# Patient Record
Sex: FEMALE | Race: WHITE | NOT HISPANIC OR LATINO | Employment: FULL TIME | URBAN - NONMETROPOLITAN AREA
[De-identification: names, ages, dates, MRNs, and addresses within clinical notes are randomized per-mention and may not be internally consistent; named-entity substitution may affect disease eponyms.]

---

## 2018-05-03 PROBLEM — M54.2 CHRONIC NECK PAIN: Status: ACTIVE | Noted: 2018-05-03

## 2018-10-11 PROBLEM — E66.812 CLASS 2 SEVERE OBESITY DUE TO EXCESS CALORIES WITH SERIOUS COMORBIDITY AND BODY MASS INDEX (BMI) OF 37.0 TO 37.9 IN ADULT: Status: ACTIVE | Noted: 2017-11-15

## 2020-02-06 ENCOUNTER — TELEPHONE (OUTPATIENT)
Dept: FAMILY MEDICINE CLINIC | Facility: CLINIC | Age: 40
End: 2020-02-06

## 2020-02-06 DIAGNOSIS — M79.2 CHRONIC NEUROPATHIC PAIN: ICD-10-CM

## 2020-02-06 DIAGNOSIS — G89.29 CHRONIC NEUROPATHIC PAIN: ICD-10-CM

## 2020-02-06 RX ORDER — GABAPENTIN 600 MG/1
600 TABLET ORAL 4 TIMES DAILY PRN
Qty: 120 TABLET | Refills: 0 | Status: SHIPPED | OUTPATIENT
Start: 2020-02-06 | End: 2020-03-09 | Stop reason: SDUPTHER

## 2020-02-06 RX ORDER — TRIAMCINOLONE ACETONIDE 5 MG/G
CREAM TOPICAL 2 TIMES DAILY
Qty: 30 G | Refills: 2 | Status: SHIPPED | OUTPATIENT
Start: 2020-02-06 | End: 2020-06-01

## 2020-02-06 NOTE — TELEPHONE ENCOUNTER
JESSIE reviewed. Refill approved. Medication E rx'd to pharmacy as requested. Pt to keep f/u apt as scheduled.    Cream sent in

## 2020-02-06 NOTE — TELEPHONE ENCOUNTER
PT needs the cream that Dr. Ocampo prescribes when she breaks out due to the solution used at her job. Pt didn't know the name of the cream but she needs a refill sent in w/ the gabapentin.

## 2020-04-15 ENCOUNTER — TELEPHONE (OUTPATIENT)
Dept: FAMILY MEDICINE CLINIC | Facility: CLINIC | Age: 40
End: 2020-04-15

## 2020-04-15 RX ORDER — PREDNISONE 20 MG/1
20 TABLET ORAL DAILY
Qty: 14 TABLET | Refills: 0 | Status: SHIPPED | OUTPATIENT
Start: 2020-04-15 | End: 2020-04-22

## 2020-04-15 RX ORDER — SULFAMETHOXAZOLE AND TRIMETHOPRIM 800; 160 MG/1; MG/1
1 TABLET ORAL 2 TIMES DAILY
Qty: 10 TABLET | Refills: 0 | Status: SHIPPED | OUTPATIENT
Start: 2020-04-15 | End: 2020-06-01 | Stop reason: ALTCHOICE

## 2020-04-15 NOTE — TELEPHONE ENCOUNTER
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  PATIENT IS REQUESTING PREDISONE AND A CREAM TO TAKE CARE OF THIS RASH.    PHARMACY VERIFIED AS:  [facility] - [place] - [address] - [phone]  - [phone] FX     PLEASE CALL PATIENT IF ANY QUESTIONS OR CONCERNS [phone]

## 2020-04-15 NOTE — TELEPHONE ENCOUNTER
I have sent in rx's for prednisone and bactrim in case she feels it has gotten infected as it has previously.    She has triamcinolone as an active rx with refills so I wanted to clarify what cream she meant.

## 2020-10-13 PROBLEM — E11.9 TYPE 2 DIABETES MELLITUS TREATED WITHOUT INSULIN: Status: ACTIVE | Noted: 2017-12-14

## 2021-01-05 PROBLEM — M51.369 DDD (DEGENERATIVE DISC DISEASE), LUMBAR: Status: ACTIVE | Noted: 2021-01-05

## 2021-01-15 PROBLEM — E66.812 CLASS 2 SEVERE OBESITY DUE TO EXCESS CALORIES WITH SERIOUS COMORBIDITY AND BODY MASS INDEX (BMI) OF 37.0 TO 37.9 IN ADULT: Status: RESOLVED | Noted: 2017-11-15 | Resolved: 2021-01-15

## 2021-04-06 ENCOUNTER — TELEPHONE (OUTPATIENT)
Dept: FAMILY MEDICINE CLINIC | Facility: CLINIC | Age: 41
End: 2021-04-06

## 2021-04-06 NOTE — TELEPHONE ENCOUNTER
Caller: Antonio Clementine    Relationship: Self    Best call back number: [phone]    What medication are you requesting: DIFLUCON    What are your current symptoms: BURNING, ITCHING    How long have you been experiencing symptoms: COUPLE OF DAYS, TAKING ANTIBIOTICS AND FORGOT TO ASK FOR ONE. I GET OFF WORK AT 2 AND WOULD LOVE TO BE ABLE TO GET THIS AT THAT TIME IF POSSIBLE.    Have you had these symptoms before:    [x] Yes  [] No    Have you been treated for these symptoms before:   [x] Yes  [] No    If a prescription is needed, what is your preferred pharmacy and phone number: [facility] - [place], [place] - [address] - [phone]  - [phone] FX     Additional notes:

## 2021-05-07 ENCOUNTER — TELEPHONE (OUTPATIENT)
Dept: FAMILY MEDICINE CLINIC | Facility: CLINIC | Age: 41
End: 2021-05-07

## 2021-05-07 NOTE — TELEPHONE ENCOUNTER
PATIENT STATED THAT SHE WAS PRESCRIBED SITagliptin (Januvia) 100 MG tablet PATIENT WOULD LIKE TO KNOW DOES SHE STOP THE glipizide (GLUCOTROL) 5 MG tablet MEDICATION OR TAKE THEM BOTH.    CALL BACK:[phone]    PATIENT STATED PLEASE LEAVE MESSAGE IF NO ANSWER

## 2021-05-07 NOTE — TELEPHONE ENCOUNTER
Notified per previous patient advise request response from Dr Ocampo she is to take Metformin, Glipizide and Januvia

## 2022-03-01 ENCOUNTER — TELEPHONE (OUTPATIENT)
Dept: FAMILY MEDICINE CLINIC | Facility: CLINIC | Age: 42
End: 2022-03-01

## 2022-03-01 NOTE — TELEPHONE ENCOUNTER
Caller: Clementine Alvarez    Relationship: Self    Best call back number:  [phone]    What is the medical concern/diagnosis:  BACK AND NECK     What specialty or service is being requested:  PHYSICAL THERAPY     What is the provider, practice or medical service name: [address]     What is the office location: [place]     What is the office phone number:    Any additional details: PATIENT STATES SHE HAS NEW INSURANCE AND HAD NOT BEEN TO PHYSICAL THERAPY IN A FEW MONTHS   THEY TOLD HER SHE WOULD NEED TO REQUEST A NEW REFERRAL

## 2022-04-04 ENCOUNTER — TELEPHONE (OUTPATIENT)
Dept: FAMILY MEDICINE CLINIC | Facility: CLINIC | Age: 42
End: 2022-04-04

## 2022-04-04 NOTE — TELEPHONE ENCOUNTER
Caller: Clementine Alvarez    Relationship: Self    Best call back number: [phone]     What is the best time to reach you: ANY TIME     Who are you requesting to speak with (clinical staff, provider,  specific staff member): DR SULLIVAN OR HER NURSE     Do you know the name of the person who called: N/A    What was the call regarding: tiZANidine (ZANAFLEX) 4 MG tablet - PATIENT STATED THIS MEDICATION KEEPS DRYING HER OUT AND MAKING HER DEHYDRATED. SHE WANTS TO KNOW IF SHE CAN TRY THE OTHER MEDICATION MENTIONED AT HER LAST APPOINTMENT. PLEASE ADVISE      Do you require a callback: YES      no

## 2022-04-11 ENCOUNTER — TELEPHONE (OUTPATIENT)
Dept: FAMILY MEDICINE CLINIC | Facility: CLINIC | Age: 42
End: 2022-04-11

## 2022-04-11 RX ORDER — FLUCONAZOLE 150 MG/1
TABLET ORAL
Qty: 2 TABLET | Refills: 0 | Status: SHIPPED | OUTPATIENT
Start: 2022-04-11 | End: 2022-05-13

## 2022-04-11 NOTE — TELEPHONE ENCOUNTER
Caller: Clementine Alvarez    Relationship: Self    Best call back number: [phone]    What medication are you requesting: DIFLUCAN    What are your current symptoms: YEAST INFECTION FROM MEDICATION (BACTRIM)    How long have you been experiencing symptoms: SINCE TAKING BACTRIM FOR RASH    Have you had these symptoms before:    [x] Yes  [x] No    Have you been treated for these symptoms before:   [x] Yes  [x] No    If a prescription is needed, what is your preferred pharmacy and phone number: [facility] - [place] - [address] - [phone]  - [phone]      Additional notes:

## 2022-07-19 ENCOUNTER — TELEPHONE (OUTPATIENT)
Dept: FAMILY MEDICINE CLINIC | Facility: CLINIC | Age: 42
End: 2022-07-19

## 2022-07-20 NOTE — TELEPHONE ENCOUNTER
Caller: Clementine Alvarez    Relationship: Self    Best call back number: [phone]    What was the call regarding:   PATIENT IS REQUESTING TO SPEAK TO JOSE ELIAS REGARDING QUESTION'S SHE HAS ABOUT A DRUG SCREEN     Do you require a callback: YES

## 2022-07-20 NOTE — TELEPHONE ENCOUNTER
"Not sure what to \"advise\" other then her UDS here in our office was positive for tapentadol. Her UDS from the suboxone clinic were fine. Although not done on same day.    I am willing to continue prescribing but will not do so if urine drug screens are abnormal in the future.  "

## 2022-07-21 NOTE — TELEPHONE ENCOUNTER
Called patient informed her of message per  she stated she did not take any drug argued she was wrongly accused inflammatory bowel disease informed her that it was reported by lab and in future if anything shows up in urine Dr Ocampo will stop prescribing medication.

## 2022-07-28 ENCOUNTER — TELEPHONE (OUTPATIENT)
Dept: FAMILY MEDICINE CLINIC | Facility: CLINIC | Age: 42
End: 2022-07-28

## 2022-07-28 RX ORDER — FLUCONAZOLE 150 MG/1
TABLET ORAL
Qty: 2 TABLET | Refills: 0 | Status: SHIPPED | OUTPATIENT
Start: 2022-07-28 | End: 2022-08-24 | Stop reason: SDUPTHER

## 2022-07-28 NOTE — TELEPHONE ENCOUNTER
Caller: Clementine Alvarez    Relationship: Self    Best call back number: [phone]    What medication are you requesting: DIFLUCAN      Have you had these symptoms before:    [x] Yes  [] No      If a prescription is needed, what is your preferred pharmacy and phone number: [facility] - [place], [place] - [address] - [phone]  - [phone] FX

## 2022-10-14 ENCOUNTER — TELEPHONE (OUTPATIENT)
Dept: PREADMISSION TESTING | Facility: HOSPITAL | Age: 42
End: 2022-10-14

## 2022-11-07 ENCOUNTER — TELEPHONE (OUTPATIENT)
Dept: OBSTETRICS AND GYNECOLOGY | Facility: CLINIC | Age: 42
End: 2022-11-07

## 2022-11-07 NOTE — TELEPHONE ENCOUNTER
Caller: Clementine Alvarez    Relationship: Self    Best call back number:[phone]-CALL ANYTIME-PT DOESN'T HAVE ANSWERING MACHINE. PT STATED VM CAN BE LEFT ON MOBILE# IF NEEDED.     What form or medical record are you requesting: WORK RELEASE     Who is requesting this form or medical record from you:PT'S EMPLOYER    How would you like to receive the form or medical records (pick-up, mail, fax): Recommendo IF POSSIBLE. PT'S EMPLOYER DOES NOT HAVE A FAX NUMBER.    Timeframe paperwork needed: AS SOON AS POSSIBLE.     Additional notes: PT WENT BACK TO WORK TODAY 11.07.22. PT WAS SENT HOME BY EMPLOYER AND TOLD SHE WILL NEED AN MEDICAL RELEASE BEFORE SHE CAN RETURN.    PT HAD SURGERY 10.31.22-

## 2022-12-09 ENCOUNTER — TELEPHONE (OUTPATIENT)
Dept: FAMILY MEDICINE CLINIC | Facility: CLINIC | Age: 42
End: 2022-12-09

## 2022-12-09 NOTE — TELEPHONE ENCOUNTER
Caller: Clementine Alvarez    Relationship: Self    Best call back number: [phone]      Requested Prescriptions:    LASIX 40 MG    Pharmacy where request should be sent: [facility] - [place], [place] - [address] - [phone]  - [phone] FX     Additional details provided by patient: PT STATED RX WAS PRESCRIBED BY PCP, STATED THAT SHE HARDLY TAKES IT.    Does the patient have less than a 3 day supply:  [x] Yes  [] No    Would you like a call back once the refill request has been completed: [x] Yes [] No    If the office needs to give you a call back, can they leave a voicemail: [x] Yes [] No    Jose Alejandro Rashid Rep   12/09/22 10:19 EST

## 2022-12-12 RX ORDER — FUROSEMIDE 20 MG/1
TABLET ORAL
Qty: 30 TABLET | Refills: 0 | Status: SHIPPED | OUTPATIENT
Start: 2022-12-12 | End: 2023-05-02

## 2022-12-28 ENCOUNTER — TELEPHONE (OUTPATIENT)
Dept: FAMILY MEDICINE CLINIC | Facility: CLINIC | Age: 42
End: 2022-12-28

## 2022-12-28 NOTE — TELEPHONE ENCOUNTER
Caller: Clementine Alvarez    Relationship: Self    Best call back number:[phone]     What was the call regarding: PATIENT CALLED TO REQUEST A PRIOR AUTHORIZATION FOR pregabalin (Lyrica) 200 MG capsule    Do you require a callback: YES

## 2022-12-30 ENCOUNTER — TELEPHONE (OUTPATIENT)
Dept: FAMILY MEDICINE CLINIC | Facility: CLINIC | Age: 42
End: 2022-12-30

## 2022-12-30 DIAGNOSIS — M79.2 CHRONIC NEUROPATHIC PAIN: ICD-10-CM

## 2022-12-30 DIAGNOSIS — G89.29 CHRONIC NEUROPATHIC PAIN: ICD-10-CM

## 2022-12-30 NOTE — TELEPHONE ENCOUNTER
Caller: Clementine Alvarez    Relationship: Self    Best call back number: [phone]    What is the best time to reach you: ANYTIME     Who are you requesting to speak with (clinical staff, provider,  specific staff member): CLINICAL    Do you know the name of the person who called: JOSE ELIAS    What was the call regarding: PATIENT IS HAVING ISSUES GETTING HER MEDICATION LYRICA, PLEASE REACH OUT AND ADVISE.    Do you require a callback: YES

## 2023-01-03 NOTE — TELEPHONE ENCOUNTER
Lyrica was denied by insurance ([place] was denied as well) would you like to send in RX for Gabapentin?

## 2023-01-04 RX ORDER — GABAPENTIN 800 MG/1
800 TABLET ORAL 3 TIMES DAILY
Qty: 90 TABLET | Refills: 0 | Status: SHIPPED | OUTPATIENT
Start: 2023-01-04 | End: 2023-01-30 | Stop reason: SDUPTHER

## 2023-02-16 ENCOUNTER — TELEPHONE (OUTPATIENT)
Dept: FAMILY MEDICINE CLINIC | Facility: CLINIC | Age: 43
End: 2023-02-16

## 2023-02-16 ENCOUNTER — TELEPHONE (OUTPATIENT)
Dept: PREADMISSION TESTING | Facility: HOSPITAL | Age: 43
End: 2023-02-16

## 2023-02-16 NOTE — TELEPHONE ENCOUNTER
Caller: Clementine Alvarez    Relationship: Self    Best call back number:      [phone]     What medication are you requesting:     ANTIBIOTIC FOR UTI SYMPTOMS    BACTRIM  DIFLUCAN    What are your current symptoms:     URGENCY TO URINATE  FREQUENCY    How long have you been experiencing symptoms:     APPROXIMATELY (2) DAYS    Have you had these symptoms before:    [x] Yes  [] No    Have you been treated for these symptoms before:   [x] Yes  [] No    If a prescription is needed, what is your preferred pharmacy and phone number:      [facility] - [place], [place]    TELEPHONE CONTACT:    [phone]    DR SULLIVAN

## 2023-02-16 NOTE — TELEPHONE ENCOUNTER
Patient came into the office for urine.  Results in Epic.  Culture has been ordered.  Please advise (clinical pool as I am out until Monday) if medication is sent to the pharmacy.  Thanks

## 2023-04-10 ENCOUNTER — TELEPHONE (OUTPATIENT)
Dept: NEUROSURGERY | Facility: CLINIC | Age: 43
End: 2023-04-10

## 2023-04-10 NOTE — TELEPHONE ENCOUNTER
JAKE FROM [facility] CALLED AND STATES A REFERRAL WAS SENT TO THEIR OFFICE AND IT WAS NOT ADDRESSED TO ANY DOCTOR.  STATES THIS NEEDS TO BE CORRECTED FOR FUTURE REFERNCE  - ALL REFERRALS MUST INCLUDE A 'S NAME - THANK YOU!

## 2023-04-10 NOTE — TELEPHONE ENCOUNTER
Caller: Clementine Alvarez    Relationship: Self    Best call back number: [phone]    Who are you requesting to speak with (clinical staff, provider,  specific staff member): CLINICAL STAFF    What was the call regarding: PATIENT IS CALLING TO VERIFY REFERRAL TO [facility] HAS BEEN COMPLETED.    Do you require a callback: YES; PLEASE CALL PATIENT TO ADVISE WHETHER REFERRAL HAS BEEN SENT.

## 2023-05-31 ENCOUNTER — TELEPHONE (OUTPATIENT)
Dept: FAMILY MEDICINE CLINIC | Facility: CLINIC | Age: 43
End: 2023-05-31

## 2023-05-31 NOTE — TELEPHONE ENCOUNTER
Caller: Clementine Alavrez    Relationship to patient: Self    Best call back number: [phone]    Chief complaint: NEEDS RX REFILLED AND CANNOT WITHOUT APPT    Type of visit: OFFICE OR MYCHART?    Requested date: MONDAY-Thursday AFTER 5PM OR ANYTIME ON Friday DUE TO WORK SCHEDULE    Additional notes: PATIENT STATED THAT SHE HAS 10 DAYS LEFT OF HER DIABETIC MEDICATION. AND THE PHARMACY ADVISED HER SHE NEEDED AN APPT WITH US. BUT NOTHING IS  AVAILABLE THAT WORKS WITH HER WORK SCHEDULE.  PLEASE ADVISE IF YOU CAN WORK HER IN ON A Friday. SHE SAID YOU CAN LEAVE A DETAILED MESSAGE.  THANK YOU.

## 2023-06-05 NOTE — TELEPHONE ENCOUNTER
Caller: Clementine Alvarez    Relationship to patient: Self    Best call back number: [phone]    Patient is needing: PATIENT CALLED BACK AND HUB MADE APPOINTMENT ON 06/09/23

## 2023-06-08 ENCOUNTER — TELEPHONE (OUTPATIENT)
Dept: PREADMISSION TESTING | Facility: HOSPITAL | Age: 43
End: 2023-06-08

## 2023-06-13 ENCOUNTER — TELEPHONE (OUTPATIENT)
Dept: FAMILY MEDICINE CLINIC | Facility: CLINIC | Age: 43
End: 2023-06-13

## 2023-06-13 NOTE — TELEPHONE ENCOUNTER
Caller: Clementine Alvarez    Relationship: Self    Best call back number: [phone]    What medication are you requesting: DIFLUCAN     What are your current symptoms: VAGINAL ITCHING    How long have you been experiencing symptoms: 06.10.23    Have you had these symptoms before:    [x] Yes  [] No    Have you been treated for these symptoms before:   [x] Yes  [] No    If a prescription is needed, what is your preferred pharmacy and phone number: [facility] - [place] - [address] - [phone]  - [phone]      Additional notes: PATIENT ADVISES SHE HAS BEEN ON ANTIBIOTICS FOR A TOOTH INFECTION AND THE ANTIBIOTICS USUALLY GIVE HER A YEAST INFECTION.

## 2023-06-16 PROBLEM — Z98.51 S/P TUBAL LIGATION: Status: ACTIVE | Noted: 2023-06-16

## 2023-06-16 PROBLEM — Z30.2 ENCOUNTER FOR STERILIZATION: Status: RESOLVED | Noted: 2023-05-09 | Resolved: 2023-06-16

## 2023-09-22 RX ORDER — FLURBIPROFEN SODIUM 0.3 MG/ML
SOLUTION/ DROPS OPHTHALMIC
Qty: 100 EACH | Refills: 4 | Status: SHIPPED | OUTPATIENT
Start: 2023-09-22

## 2023-09-27 DIAGNOSIS — E61.1 IRON DEFICIENCY: ICD-10-CM

## 2023-09-27 RX ORDER — DOXYCYCLINE HYCLATE 50 MG/1
324 CAPSULE, GELATIN COATED ORAL 2 TIMES DAILY WITH MEALS
Qty: 60 TABLET | Refills: 0 | Status: SHIPPED | OUTPATIENT
Start: 2023-09-27

## 2023-09-29 ENCOUNTER — TELEMEDICINE (OUTPATIENT)
Dept: PSYCHIATRY | Facility: CLINIC | Age: 43
End: 2023-09-29
Payer: COMMERCIAL

## 2023-09-29 DIAGNOSIS — F43.10 POST TRAUMATIC STRESS DISORDER (PTSD): ICD-10-CM

## 2023-09-29 DIAGNOSIS — F33.0 MILD EPISODE OF RECURRENT MAJOR DEPRESSIVE DISORDER: ICD-10-CM

## 2023-09-29 DIAGNOSIS — F51.5 NIGHTMARES: ICD-10-CM

## 2023-09-29 DIAGNOSIS — F41.1 GENERALIZED ANXIETY DISORDER: ICD-10-CM

## 2023-09-29 RX ORDER — PRAZOSIN HYDROCHLORIDE 1 MG/1
1 CAPSULE ORAL NIGHTLY
Qty: 30 CAPSULE | Refills: 2 | Status: SHIPPED | OUTPATIENT
Start: 2023-09-29

## 2023-09-29 RX ORDER — BUSPIRONE HYDROCHLORIDE 15 MG/1
15 TABLET ORAL 4 TIMES DAILY
Qty: 120 TABLET | Refills: 2 | Status: SHIPPED | OUTPATIENT
Start: 2023-09-29

## 2023-09-29 RX ORDER — FLUOXETINE 10 MG/1
10 CAPSULE ORAL DAILY
Qty: 30 CAPSULE | Refills: 2 | Status: SHIPPED | OUTPATIENT
Start: 2023-09-29

## 2023-09-29 NOTE — PROGRESS NOTES
This provider is located at [facility], Behavioral Health ,[address], [address] [facility] in [place] [address], using a secure MyChart Video Visit through [employer]. Patient is being seen remotely via telehealth at their home address in [place] [address], and stated they are in a secure environment for this session. The patient's condition being diagnosed/treated is appropriate for telemedicine. The provider identified herself as well as her credentials. The patient, and/or patients guardian, consent to be seen remotely, and when consent is given they understand that the consent allows for patient identifiable information to be sent to a third party as needed.   They may refuse to be seen remotely at any time. The electronic data is encrypted and password protected, and the patient and/or guardian has been advised of the potential risks to privacy not withstanding such measures.     You have chosen to receive care through a telehealth visit.  Do you consent to use a video/audio connection for your medical care today? Yes    Subjective   Clementine Johnson is a 43 y.o. female who presents today for follow up    Chief Complaint: Depression and anxiety    History of Present Illness:   History of Present Illness  Clementine Johnson presents today via MyChart video visit for medication management follow-up.  Taking Prozac 10 mg daily, prazosin 1 mg nightly and buspirone 15 mg 4 times daily.  Reports increased stressors that have caused increased anxiety levels. Recently changed jobs and is now working second shift. Says that she goes into work around 4 PM and is off around 2:30 AM. Continues to struggle with neck pain.  Had appointment for evaluation with neurology for her neck pain, but appointment was rescheduled to later date (December 1). Says that these things make her feel anxious, but feels symptoms are manageable at this time. Rates anxiety 6-7/10 on a 0-10 scale with 10 being the worst.   "Feels that depression is adequately controlled, says that there have been some positive things occur. Her divorce was finalized and now has her home in her name.  Sleep is sometimes decreased, often due to pain and current shift with work.  Voices that she continues to notice improvement in nightmares.  Denies any appetite changes. Has continued with MAT and says she is doing well. Denies any SI/HI or AVH.     Previous Psych Meds: Zoloft, Paxil, Xanax.  Currently taking Prozac, buspirone and hydroxyzine x 5 years.     The following portions of the patient's history were reviewed and updated as appropriate: allergies, current medications, past family history, past medical history, past social history, past surgical history and problem list.      Past Medical History:  Past Medical History:   Diagnosis Date    Anxiety     Arthritis     DDD (degenerative disc disease), cervical     DDD (degenerative disc disease), lumbar     Depression     Diabetes mellitus     Ear piercing     Gestational diabetes     HPV (human papilloma virus) infection     Lower back pain     PMS (premenstrual syndrome)     Pregnancy     A0 s/p CS x 1    Seasonal allergies     Substance abuse     Toxemia in pregnancy     Trauma     Varicella     Wears contact lenses     Wears glasses        Social History:  Social History     Socioeconomic History    Marital status: Legally    Tobacco Use    Smoking status: Every Day     Packs/day: 1.00     Years: 22.00     Pack years: 22.00     Types: Cigarettes     Start date: 1995     Passive exposure: Current    Smokeless tobacco: Never   Vaping Use    Vaping Use: Never used   Substance and Sexual Activity    Alcohol use: No    Drug use: Not Currently     Types: Benzodiazepines, Hydrocodone, Marijuana, Oxycodone     Comment: last use for any pills has been \"years\".  Last marijuana use was 5-6 months ago (assessed 10/17/22)    Sexual activity: Defer       Family History:  Family History "   Problem Relation Age of Onset    Arthritis Mother     Arthritis Father     Arthritis Maternal Aunt     Stroke Paternal Uncle     Arthritis Paternal Grandmother     Stroke Maternal Grandmother        Past Surgical History:  Past Surgical History:   Procedure Laterality Date     SECTION      x1    COLPOSCOPY N/A 10/31/2022    Procedure: COLPOSCOPY OF THE VULVA, WIDE LOCAL EXCISION OF VULVAR LESIONS TIMES FIVE;  Surgeon: Nelson Copeland MD;  Location: [facility];  Service: Obstetrics/Gynecology;  Laterality: N/A;    TUBAL COAGULATION LAPAROSCOPIC N/A 2023    Procedure: LAPAROSCOPIC TUBAL LIGATION, DRAINAGE OF LEFT OVARIAN CYST, HYSTEROSCOPY, DILATION AND CURETTAGE, ENDOMETRIAL ABLATION WITH NOVASURE;  Surgeon: Nelson Copeland MD;  Location: [facility] OR;  Service: Obstetrics/Gynecology;  Laterality: N/A;    VULVA BIOPSY N/A 3/3/2023    Procedure: Colposcopy of vulva, Wide local excision of vulvar lesion;  Surgeon: Nelson Copeland MD;  Location: [facility];  Service: Obstetrics/Gynecology;  Laterality: N/A;    WISDOM TOOTH EXTRACTION         Problem List:  Patient Active Problem List   Diagnosis    Depression with anxiety    Meralgia paresthetica of left side    Vitamin D deficiency    Type 2 diabetes mellitus with hyperglycemia, without long-term current use of insulin    Chronic neck pain    Cervical radiculopathy    Seasonal allergic rhinitis due to pollen    Bronchospasm    Essential hypertension    Situational anxiety    DDD (degenerative disc disease), cervical    DDD (degenerative disc disease), lumbar    Bilateral carpal tunnel syndrome    Chronic contact dermatitis    Abnormal uterine bleeding (AUB)    Genital warts    Vulvar intraepithelial neoplasia (FAN) grade 3    Morbid obesity    Posttraumatic torticollis    Dependent edema    S/P tubal ligation    S/P endometrial ablation       Allergy:   Allergies   Allergen Reactions    Nsaids Rash and Swelling     Swelling in the legs/feet     Codeine Rash    Erythromycin Rash    Erythromycin Base Rash    Tramadol Rash        Current Medications:   Current Outpatient Medications   Medication Sig Dispense Refill    busPIRone (BUSPAR) 15 MG tablet Take 1 tablet by mouth 4 (Four) Times a Day. 120 tablet 2    FLUoxetine (PROzac) 10 MG capsule Take 1 capsule by mouth Daily. 30 capsule 2    prazosin (MINIPRESS) 1 MG capsule Take 1 capsule by mouth Every Night. 30 capsule 2    B-D UF III MINI PEN NEEDLES 31G X 5 MM misc FOR USE WITH INSULIN PENS. FOLLOW INSTRUCTIONS ON INSULIN PENS. 100 each 4    baclofen (LIORESAL) 10 MG tablet 1-2 po tid as needed for muscle spasm 90 tablet 2    buprenorphine-naloxone (SUBOXONE) 8-2 MG per SL tablet PLACE 2 TABLET UNDER TONGUE ONCE A DAY      enalapril (VASOTEC) 5 MG tablet Take 1 tablet by mouth Daily. 30 tablet 11    ergocalciferol (ERGOCALCIFEROL) 1.25 MG (38319 UT) capsule Take 1 capsule by mouth Every 7 (Seven) Days. 4 capsule 2    ferrous gluconate (FERGON) 324 MG tablet Take 1 tablet by mouth 2 (Two) Times a Day With Meals. Or take with vitamin C. 60 tablet 0    gabapentin (NEURONTIN) 800 MG tablet Take 1 tablet by mouth 3 (Three) Times a Day. 90 tablet 1    glipizide (GLUCOTROL) 5 MG tablet Take 1 tablet by mouth 2 (Two) Times a Day Before Meals. 60 tablet 11    glucose blood (ONE TOUCH ULTRA TEST) test strip Check blood sugar twice a day. 100 each 2    glucose monitor monitoring kit 1 each 2 (Two) Times a Day 1 each 0    HumuLIN 70/30 KwikPen (70-30) 100 UNIT/ML suspension pen-injector       hydrOXYzine (ATARAX) 25 MG tablet Take 1 tablet by mouth Every 6 (Six) Hours As Needed.  0    Insulin Pen Needle 30G X 5 MM misc For use with insulin pens. Follow instructions on insulin pens. 100 each 5    metFORMIN ER (GLUCOPHAGE-XR) 500 MG 24 hr tablet Take 2 tablets by mouth Daily With Breakfast. 60 tablet 11    ONETOUCH DELICA LANCETS 33G misc Inject 1 each under the skin into the appropriate area as directed 2 (Two) Times a  Day. 100 each 11    pregabalin (Lyrica) 200 MG capsule Take 1 capsule by mouth 3 (Three) Times a Day. 90 capsule 0    SITagliptin (Januvia) 100 MG tablet Take 1 tablet by mouth Daily. 30 tablet 11     No current facility-administered medications for this visit.     Review of Systems   Constitutional:  Negative for activity change, appetite change, unexpected weight gain and unexpected weight loss.   Respiratory:  Negative for shortness of breath.    Cardiovascular:  Negative for chest pain.   Psychiatric/Behavioral:  Positive for sleep disturbance and depressed mood. Negative for suicidal ideas. The patient is nervous/anxious.      Physical Exam  Constitutional:       General: She is not in acute distress.     Appearance: Normal appearance.   Neurological:      Mental Status: She is alert.     Vitals:   There were no vitals taken for this visit. There is no height or weight on file to calculate BMI.  Due to extenuating circumstances and possible current health risks associated with the patient being present in a clinical setting (with current health restrictions in place in regards to possible COVID 19 transmission/exposure), the patient was seen remotely today via a MyChart Video Visit through [employer].  Unable to obtain vital signs due to nature of remote visit.    Mental Status Exam:   Hygiene:    Appears good  Cooperation:  Cooperative  Eye Contact:   ANA  Psychomotor Behavior:  Appropriate  Affect:  Appropriate  Mood: normal  Hopelessness: Denies  Speech:  Normal  Thought Process:  Goal directed and Linear  Thought Content:  Mood congruent  Suicidal:  None  Homicidal:  None  Hallucinations:  None  Delusion:  None  Memory:  Intact  Orientation:  Person, Place, Time, and Situation  Reliability:  good  Insight:  Good  Judgement:  Good  Impulse Control:  Good    Lab Results:   Admission on 06/16/2023, Discharged on 06/16/2023   Component Date Value Ref Range Status    HCG, Urine, QL 06/16/2023 Negative  Negative Final     Lot Number 2023 [id number]   Final    Internal Positive Control 2023 Passed  Positive, Passed Final    Internal Negative Control 2023 Passed  Negative, Passed Final    Expiration Date 2023   Final    Glucose 2023 209 (H)  70 - 130 mg/dL Final    Serial Number: [id number]:  [id number]    Reference Lab Report 2023    Final                    Value:[facility]  [address] [place] [address]  Phone: [phone] or [phone]  Fax: [phone]  Paolo Anderson M.D., Medical Director    PATIENT NAME                                     LABORATORY NO.  TAMAR HUITRON.                                [id number]  [id number]                                 AGE                    SEX   N              CLIENT REF #  [facility]                    43        1980      F     [id number]      [id number]    [address] [place] [address] BY-PASS                        REQUESTING M.D.           ATTENDING M.D.         COPY TO.   [address]                                [place], [place] [address]                         DATE COLLECTED            DATE RECEIVED          DATE REPORTED  2023    DIAGNOSIS:  A.     OVARIAN CYST, LEFT:  Hemorrhagic corpus luteal cyst  Negative for malignancy  B.     ENDOMETRIUM,                           CURETTINGS:  Disordered proliferative phase endometrium  Negative for hyperplasia or malignancy    RLL    CLINICAL HISTORY:  Abnormal uterine bleeding (AUB), encounter for sterilization    SPECIMENS RECEIVED:  A.    OVARIAN CYST, LEFT  B.    ENDOMETRIUM, CURETTINGS    MICROSCOPIC DESCRIPTION:  Tissue blocks are prepared and slides are examined microscopically on all  specimens. See diagnosis for details.    Professional interpretation rendered by Paolo Anderson M.D., F.C.A.P. at  [facility], "[employer] [address] [place] [address]  "[place] [address].    GROSS DESCRIPTION:  A.    Received in formalin labeled \"left ovarian cyst is a 1.8 x 1.5 x 1.2 cm  portion of dark red, hemorrhagic tissue, bisected and submitted in block  A1.  B.    Received in formalin labeled \"endometrial curettings\" is a 2.5 x 2.5 x 0.4  cm aggregate of red-tan morcellated soft tissue fragments, blood and  mucus that are filtered and submitted entirely in block B1.  HDM    REVIEWED, DIAGNOSED AND ELECTRONICALLY  SIGNED                           BY:    Paolo Anderson M.D., FARZANA  CPT CODES:  88305x2     Pre-Admission Testing on 06/09/2023   Component Date Value Ref Range Status    Color, UA 06/09/2023 Yellow  Yellow, Straw Final    Appearance, UA 06/09/2023 Clear  Clear Final    pH, UA 06/09/2023 6.5  5.0 - 8.0 Final    Specific Gravity, UA 06/09/2023 1.016  1.005 - 1.030 Final    Glucose, UA 06/09/2023 Negative  Negative Final    Ketones, UA 06/09/2023 Negative  Negative Final    Bilirubin, UA 06/09/2023 Negative  Negative Final    Blood, UA 06/09/2023 Negative  Negative Final    Protein, UA 06/09/2023 Trace (A)  Negative Final    Leuk Esterase, UA 06/09/2023 Negative  Negative Final    Nitrite, UA 06/09/2023 Negative  Negative Final    Urobilinogen, UA 06/09/2023 0.2 E.U./dL  0.2 - 1.0 E.U./dL Final    Glucose 06/09/2023 151 (H)  65 - 99 mg/dL Final    BUN 06/09/2023 5 (L)  6 - 20 mg/dL Final    Creatinine 06/09/2023 0.67  0.57 - 1.00 mg/dL Final    Sodium 06/09/2023 141  136 - 145 mmol/L Final    Potassium 06/09/2023 4.0  3.5 - 5.2 mmol/L Final    Chloride 06/09/2023 103  98 - 107 mmol/L Final    CO2 06/09/2023 28.3  22.0 - 29.0 mmol/L Final    Calcium 06/09/2023 9.4  8.6 - 10.5 mg/dL Final    BUN/Creatinine Ratio 06/09/2023 7.5  7.0 - 25.0 Final    Anion Gap 06/09/2023 9.7  5.0 - 15.0 mmol/L Final    eGFR 06/09/2023 112.1  >60.0 mL/min/1.73 Final    WBC 06/09/2023 10.75  3.40 - 10.80 10*3/mm3 Final    RBC 06/09/2023 4.97  3.77 - 5.28 10*6/mm3 Final    " Hemoglobin 06/09/2023 13.9  12.0 - 15.9 g/dL Final    Hematocrit 06/09/2023 42.9  34.0 - 46.6 % Final    MCV 06/09/2023 86.3  79.0 - 97.0 fL Final    MCH 06/09/2023 28.0  26.6 - 33.0 pg Final    MCHC 06/09/2023 32.4  31.5 - 35.7 g/dL Final    RDW 06/09/2023 13.3  12.3 - 15.4 % Final    RDW-SD 06/09/2023 41.4  37.0 - 54.0 fl Final    MPV 06/09/2023 10.2  6.0 - 12.0 fL Final    Platelets 06/09/2023 286  140 - 450 10*3/mm3 Final    Neutrophil % 06/09/2023 71.5  42.7 - 76.0 % Final    Lymphocyte % 06/09/2023 18.8 (L)  19.6 - 45.3 % Final    Monocyte % 06/09/2023 7.1  5.0 - 12.0 % Final    Eosinophil % 06/09/2023 1.5  0.3 - 6.2 % Final    Basophil % 06/09/2023 0.7  0.0 - 1.5 % Final    Immature Grans % 06/09/2023 0.4  0.0 - 0.5 % Final    Neutrophils, Absolute 06/09/2023 7.69 (H)  1.70 - 7.00 10*3/mm3 Final    Lymphocytes, Absolute 06/09/2023 2.02  0.70 - 3.10 10*3/mm3 Final    Monocytes, Absolute 06/09/2023 0.76  0.10 - 0.90 10*3/mm3 Final    Eosinophils, Absolute 06/09/2023 0.16  0.00 - 0.40 10*3/mm3 Final    Basophils, Absolute 06/09/2023 0.08  0.00 - 0.20 10*3/mm3 Final    Immature Grans, Absolute 06/09/2023 0.04  0.00 - 0.05 10*3/mm3 Final    nRBC 06/09/2023 0.0  0.0 - 0.2 /100 WBC Final   Office Visit on 05/02/2023   Component Date Value Ref Range Status    Hemoglobin A1C 05/02/2023 7.9  % Final    Lot Number 05/02/2023 [id number]   Final    Expiration Date 05/02/2023 11/3/2024   Final       EKG Results:  No orders to display       Assessment & Plan   Problems Addressed this Visit    None  Visit Diagnoses       Generalized anxiety disorder        Relevant Medications    FLUoxetine (PROzac) 10 MG capsule    busPIRone (BUSPAR) 15 MG tablet    Post traumatic stress disorder (PTSD)        Relevant Medications    FLUoxetine (PROzac) 10 MG capsule    busPIRone (BUSPAR) 15 MG tablet    Nightmares        Relevant Medications    prazosin (MINIPRESS) 1 MG capsule    FLUoxetine (PROzac) 10 MG capsule    busPIRone (BUSPAR) 15  MG tablet    Mild episode of recurrent major depressive disorder        Relevant Medications    FLUoxetine (PROzac) 10 MG capsule    busPIRone (BUSPAR) 15 MG tablet          Diagnoses         Codes Comments    Generalized anxiety disorder     ICD-10-CM: F41.1  ICD-9-CM: 300.02     Post traumatic stress disorder (PTSD)     ICD-10-CM: F43.10  ICD-9-CM: 309.81     Nightmares     ICD-10-CM: F51.5  ICD-9-CM: 307.47     Mild episode of recurrent major depressive disorder     ICD-10-CM: F33.0  ICD-9-CM: 296.31             Visit Diagnoses:    ICD-10-CM ICD-9-CM   1. Generalized anxiety disorder  F41.1 300.02   2. Post traumatic stress disorder (PTSD)  F43.10 309.81   3. Nightmares  F51.5 307.47   4. Mild episode of recurrent major depressive disorder  F33.0 296.31       Clementine presents today via MyChart video visit for medication management follow-up.  She has noticed increase in anxiety lately, attributes this to stressors related to situational issues.  Recently changed jobs and is now working second shift and is having trouble with this schedule. Also having neck pain, had evaluation scheduled with neurology, but appointment was postponed to December 1st. Voices that depression is currently adequately controlled.  Says that she is happy with current medication regimen and would like to continue with all medications as previously prescribed.  Will continue with Prozac 10 mg daily, prazosin 1 mg nightly and buspirone 15 mg 4 times daily.  Denies any adverse effects of current medication regimen.    -Refill Prozac 10 mg daily for anxiety and depression  -Refill prazosin 1 mg at night for nightmares/sleep  -Refill buspirone 15 mg 4 times daily for anxiety    -Reviewed previous available documentation and most recent available labs. JESSIE reviewed and is appropriate.      GOALS:  Short Term Goals: Patient will be compliant with medication, and patient will have no significant medication related side effects.  Patient will be  engaged in psychotherapy as indicated.  Patient will report subjective improvement of symptoms.  Long term goals: To stabilize mood and treat/improve subjective symptoms, the patient will stay out of the hospital, the patient will be at an optimal level of functioning, and the patient will take all medications as prescribed.  The patient/guardian verbalized understanding and agreement with goals that were mutually set.    TREATMENT PLAN: Continue supportive psychotherapy efforts and medications as indicated for patient's diagnosis.  Pharmacological and Non-Pharmacological treatment options discussed during today's visit. Patient/Guardian acknowledged and verbally consented with current treatment plan and was educated on the importance of compliance with treatment and follow-up appointments.      MEDICATION ISSUES:  Discussed medication options and treatment plan of prescribed medication as well as the risks, benefits, any black box warnings, and side effects including potential falls, possible impaired driving, and metabolic adversities among others. Patient is agreeable to call the office with any worsening of symptoms or onset of side effects, or if any concerns or questions arise.  The contact information for the office is made available to the patient. Patient is agreeable to call 911 or go to the nearest ER should they begin having any SI/HI, or if any urgent concerns arise. No medication side effects or related complaints today.     MEDS ORDERED DURING VISIT:  New Medications Ordered This Visit   Medications    prazosin (MINIPRESS) 1 MG capsule     Sig: Take 1 capsule by mouth Every Night.     Dispense:  30 capsule     Refill:  2    FLUoxetine (PROzac) 10 MG capsule     Sig: Take 1 capsule by mouth Daily.     Dispense:  30 capsule     Refill:  2    busPIRone (BUSPAR) 15 MG tablet     Sig: Take 1 tablet by mouth 4 (Four) Times a Day.     Dispense:  120 tablet     Refill:  2     06/02/2023 11:36:28 AM        FOLLOW UP:  Return in about 3 months (around 12/29/2023) for Recheck.             This document has been electronically signed by SHANELLE Galvez  September 29, 2023 09:43 EDT    Please note that portions of this note were completed with a voice recognition program. Efforts were made to edit dictation, but occasionally words are mistranscribed.

## 2023-10-03 DIAGNOSIS — G56.03 BILATERAL CARPAL TUNNEL SYNDROME: ICD-10-CM

## 2023-10-03 DIAGNOSIS — M51.36 DDD (DEGENERATIVE DISC DISEASE), LUMBAR: ICD-10-CM

## 2023-10-03 DIAGNOSIS — M50.30 DDD (DEGENERATIVE DISC DISEASE), CERVICAL: ICD-10-CM

## 2023-10-03 DIAGNOSIS — M54.12 CERVICAL RADICULOPATHY: ICD-10-CM

## 2023-10-03 RX ORDER — PREGABALIN 200 MG/1
200 CAPSULE ORAL 3 TIMES DAILY
Qty: 90 CAPSULE | Refills: 1 | Status: SHIPPED | OUTPATIENT
Start: 2023-10-03

## 2023-10-03 NOTE — TELEPHONE ENCOUNTER
Rx Refill Note  Requested Prescriptions     Pending Prescriptions Disp Refills    pregabalin (Lyrica) 200 MG capsule 90 capsule 0     Sig: Take 1 capsule by mouth 3 (Three) Times a Day.      Last office visit with prescribing clinician: 8/14/2023   Last telemedicine visit with prescribing clinician: Visit date not found   Next office visit with prescribing clinician: 11/15/2023                         Would you like a call back once the refill request has been completed: [] Yes [] No    If the office needs to give you a call back, can they leave a voicemail: [] Yes [] No    Lola Garcia MA  10/03/23, 12:57 EDT

## 2023-10-03 NOTE — TELEPHONE ENCOUNTER
JESSIE reviewed. Refill approved. Medication E rx'd to pharmacy as requested. Pt to keep f/u apt as scheduled.    UDS and CSA UTD

## 2023-10-05 ENCOUNTER — TELEPHONE (OUTPATIENT)
Dept: FAMILY MEDICINE CLINIC | Facility: CLINIC | Age: 43
End: 2023-10-05
Payer: COMMERCIAL

## 2023-10-05 DIAGNOSIS — G89.29 CHRONIC NONINTRACTABLE HEADACHE, UNSPECIFIED HEADACHE TYPE: Primary | ICD-10-CM

## 2023-10-05 DIAGNOSIS — R51.9 CHRONIC NONINTRACTABLE HEADACHE, UNSPECIFIED HEADACHE TYPE: Primary | ICD-10-CM

## 2023-10-05 NOTE — TELEPHONE ENCOUNTER
Caller: Clementine Johnson    Relationship: Self    Best call back number: [phone]    What is the medical concern/diagnosis: NEUROLOGY     What specialty or service is being requested: BOTOX    What is the provider, practice or medical service name:     What is the office location: NO FURTHER THAN [place] UNLESS SHE HAS TO.      Any additional details: PATIENT HAS BEEN WAITING 8-9 MONTHS TO GET SCHEDULED AND  CANCLED HER UP COMING APPOINTMENT. PATIENT IS REQUESTING A REFERRAL ELSEWHERE SO SHE CAN GET IN FASTER, PATIENT IS IN A LOT OF PAIN.

## 2023-10-18 ENCOUNTER — TELEPHONE (OUTPATIENT)
Dept: PSYCHIATRY | Facility: CLINIC | Age: 43
End: 2023-10-18
Payer: COMMERCIAL

## 2023-10-18 NOTE — TELEPHONE ENCOUNTER
Clementine called in requesting a diagnosis letter, with medications she is on. She is trying to get disability again, and they are requesting a letter. I advised her it may be a $25 fee, and she asked for us to give her a call if you are able to do this, and once it is completed. Thank you.

## 2023-10-26 ENCOUNTER — OFFICE VISIT (OUTPATIENT)
Dept: FAMILY MEDICINE CLINIC | Facility: CLINIC | Age: 43
End: 2023-10-26
Payer: COMMERCIAL

## 2023-10-26 VITALS
BODY MASS INDEX: 40.12 KG/M2 | DIASTOLIC BLOOD PRESSURE: 90 MMHG | SYSTOLIC BLOOD PRESSURE: 156 MMHG | RESPIRATION RATE: 16 BRPM | HEIGHT: 62 IN | OXYGEN SATURATION: 96 % | TEMPERATURE: 97 F | WEIGHT: 218 LBS | HEART RATE: 76 BPM

## 2023-10-26 DIAGNOSIS — J02.9 SORETHROAT: Primary | ICD-10-CM

## 2023-10-26 DIAGNOSIS — J02.0 STREP PHARYNGITIS: ICD-10-CM

## 2023-10-26 LAB
EXPIRATION DATE: NORMAL
INTERNAL CONTROL: NORMAL
Lab: NORMAL
S PYO AG THROAT QL: NEGATIVE

## 2023-10-26 RX ORDER — CEFDINIR 300 MG/1
300 CAPSULE ORAL 2 TIMES DAILY
Qty: 14 CAPSULE | Refills: 0 | Status: SHIPPED | OUTPATIENT
Start: 2023-10-26 | End: 2023-11-02

## 2023-10-26 RX ORDER — FLUCONAZOLE 150 MG/1
TABLET ORAL
Qty: 2 TABLET | Refills: 0 | Status: SHIPPED | OUTPATIENT
Start: 2023-10-26

## 2023-10-26 RX ORDER — CEFTRIAXONE 1 G/1
1 INJECTION, POWDER, FOR SOLUTION INTRAMUSCULAR; INTRAVENOUS ONCE
Status: COMPLETED | OUTPATIENT
Start: 2023-10-26 | End: 2023-10-26

## 2023-10-26 RX ADMIN — CEFTRIAXONE 1 G: 1 INJECTION, POWDER, FOR SOLUTION INTRAMUSCULAR; INTRAVENOUS at 15:56

## 2023-10-26 NOTE — PROGRESS NOTES
"                      Established Patient        Chief Complaint:   Chief Complaint   Patient presents with    Sore Throat     White blisters in throat. Son had strep          History of Present Illness:    Clementine Johnson is a 43 y.o. female who presents today with sore throat and dysuria.    States the sore throat started after she had contact with her son that was recently diagnosed with strep throat. Also states that she has noted white spots on the back of her throat.    Currently has burning with urination. History of UTIs. States it feels like her previous UTIs    Subjective     The following portions of the patient's history were reviewed and updated as appropriate: allergies, current medications, past family history, past medical history, past social history, past surgical history and problem list.    ALLERGIES  Allergies   Allergen Reactions    Nsaids Rash and Swelling     Swelling in the legs/feet    Codeine Rash    Erythromycin Rash    Erythromycin Base Rash    Tramadol Rash       ROS  Review of Systems   Constitutional:  Positive for chills, fatigue and fever.   HENT:  Positive for postnasal drip, rhinorrhea and sore throat.    Respiratory:  Negative for cough and shortness of breath.    Cardiovascular:  Negative for chest pain and palpitations.   Gastrointestinal:  Negative for diarrhea and nausea.   Genitourinary:  Positive for dysuria.       Objective     Vital Signs:   /90   Pulse 76   Temp 97 °F (36.1 °C)   Resp 16   Ht 157.5 cm (62\")   Wt 98.9 kg (218 lb)   SpO2 96%   BMI 39.87 kg/m²             Physical Exam   Physical Exam  Constitutional:       Appearance: She is obese. She is ill-appearing.   HENT:      Mouth/Throat:      Pharynx: Oropharyngeal exudate and posterior oropharyngeal erythema present.   Eyes:      Pupils: Pupils are equal, round, and reactive to light.   Pulmonary:      Effort: Pulmonary effort is normal.   Abdominal:      Tenderness: There is no guarding. "   Neurological:      Mental Status: She is alert and oriented to person, place, and time.   Psychiatric:         Mood and Affect: Mood normal.         Behavior: Behavior normal.         Assessment and Plan      Assessment/Plan:   Diagnoses and all orders for this visit:    1. Sorethroat (Primary)  -     POCT rapid strep A  -     cefTRIAXone (ROCEPHIN) injection 1 g    2. Strep pharyngitis  -     cefdinir (OMNICEF) 300 MG capsule; Take 1 capsule by mouth 2 (Two) Times a Day for 7 days.  Dispense: 14 capsule; Refill: 0  -     cefTRIAXone (ROCEPHIN) injection 1 g    Other orders  -     fluconazole (Diflucan) 150 MG tablet; Take one tablet today, may repeat in 3-4 days  Dispense: 2 tablet; Refill: 0    Risks, benefits, and potential side effects of current/new medications reviewed with patient.  Patient voiced understanding and wished to proceed with Rocephin for treatment of strep throat. Patient was also sent prescription for Cefdinir. Patient encouraged to fill prescription in 3 days if symptoms persist.    Patient was encouraged to keep me informed of any acute changes, lack of improvement, or any new concerning symptoms.    Patient voiced understanding of all instructions and denied further questions.      I have reviewed and updated all copied forward information, as appropriate.  I attest to the accuracy and relevance of any unchanged information.      Follow up:  Return if symptoms worsen or fail to improve.     Patient Education:  There are no Patient Instructions on file for this visit.    SHANELLE Davidson  11/01/23  10:33 EDT          Please note that portions of this note may have been completed with a voice recognition program.

## 2023-11-09 DIAGNOSIS — E61.1 IRON DEFICIENCY: ICD-10-CM

## 2023-11-09 RX ORDER — FLUCONAZOLE 150 MG/1
TABLET ORAL
Qty: 2 TABLET | Refills: 0 | Status: SHIPPED | OUTPATIENT
Start: 2023-11-09

## 2023-11-09 RX ORDER — FERROUS GLUCONATE 324(38)MG
324 TABLET ORAL 2 TIMES DAILY WITH MEALS
Qty: 60 TABLET | Refills: 0 | Status: SHIPPED | OUTPATIENT
Start: 2023-11-09

## 2023-11-13 DIAGNOSIS — E55.9 VITAMIN D DEFICIENCY: ICD-10-CM

## 2023-11-13 RX ORDER — ERGOCALCIFEROL 1.25 MG/1
1 CAPSULE ORAL
Qty: 4 CAPSULE | Refills: 2 | Status: CANCELLED | OUTPATIENT
Start: 2023-11-13

## 2023-11-15 ENCOUNTER — OFFICE VISIT (OUTPATIENT)
Dept: FAMILY MEDICINE CLINIC | Facility: CLINIC | Age: 43
End: 2023-11-15
Payer: MEDICAID

## 2023-11-15 VITALS
SYSTOLIC BLOOD PRESSURE: 134 MMHG | HEIGHT: 62 IN | DIASTOLIC BLOOD PRESSURE: 72 MMHG | TEMPERATURE: 98.4 F | WEIGHT: 214.8 LBS | HEART RATE: 106 BPM | BODY MASS INDEX: 39.53 KG/M2 | OXYGEN SATURATION: 97 %

## 2023-11-15 DIAGNOSIS — E61.1 IRON DEFICIENCY: ICD-10-CM

## 2023-11-15 DIAGNOSIS — R82.90 ABNORMAL URINALYSIS: ICD-10-CM

## 2023-11-15 DIAGNOSIS — E66.01 MORBID OBESITY: ICD-10-CM

## 2023-11-15 DIAGNOSIS — E11.65 TYPE 2 DIABETES MELLITUS WITH HYPERGLYCEMIA, WITHOUT LONG-TERM CURRENT USE OF INSULIN: ICD-10-CM

## 2023-11-15 DIAGNOSIS — Z01.84 IMMUNITY STATUS TESTING: ICD-10-CM

## 2023-11-15 DIAGNOSIS — Z23 NEED FOR TDAP VACCINATION: ICD-10-CM

## 2023-11-15 DIAGNOSIS — E53.8 VITAMIN B12 DEFICIENCY: ICD-10-CM

## 2023-11-15 DIAGNOSIS — Z23 NEED FOR INFLUENZA VACCINATION: ICD-10-CM

## 2023-11-15 DIAGNOSIS — R30.0 DYSURIA: Primary | ICD-10-CM

## 2023-11-15 DIAGNOSIS — I10 ESSENTIAL HYPERTENSION: ICD-10-CM

## 2023-11-15 DIAGNOSIS — R74.8 ABNORMAL LIVER ENZYMES: ICD-10-CM

## 2023-11-15 PROBLEM — G89.4 CHRONIC PAIN SYNDROME: Status: ACTIVE | Noted: 2023-11-15

## 2023-11-15 LAB
BILIRUB BLD-MCNC: NEGATIVE MG/DL
CLARITY, POC: ABNORMAL
COLOR UR: YELLOW
EXPIRATION DATE: ABNORMAL
GLUCOSE UR STRIP-MCNC: NEGATIVE MG/DL
KETONES UR QL: NEGATIVE
LEUKOCYTE EST, POC: ABNORMAL
Lab: ABNORMAL
NITRITE UR-MCNC: NEGATIVE MG/ML
PH UR: 6 [PH] (ref 5–8)
PROT UR STRIP-MCNC: NEGATIVE MG/DL
RBC # UR STRIP: ABNORMAL /UL
SP GR UR: 1.02 (ref 1–1.03)
UROBILINOGEN UR QL: ABNORMAL

## 2023-11-15 RX ORDER — FLUCONAZOLE 150 MG/1
TABLET ORAL
Qty: 2 TABLET | Refills: 0 | Status: SHIPPED | OUTPATIENT
Start: 2023-11-15

## 2023-11-15 RX ORDER — SULFAMETHOXAZOLE AND TRIMETHOPRIM 800; 160 MG/1; MG/1
1 TABLET ORAL 2 TIMES DAILY
Qty: 10 TABLET | Refills: 0 | Status: SHIPPED | OUTPATIENT
Start: 2023-11-15 | End: 2023-11-20

## 2023-11-16 LAB
ALBUMIN SERPL-MCNC: 4.2 G/DL (ref 3.5–5.2)
ALBUMIN/CREAT UR: 37 MG/G CREAT (ref 0–29)
ALBUMIN/GLOB SERPL: 1.6 G/DL
ALP SERPL-CCNC: 88 U/L (ref 39–117)
ALT SERPL-CCNC: 11 U/L (ref 1–33)
AST SERPL-CCNC: 10 U/L (ref 1–32)
BASOPHILS # BLD AUTO: 0.08 10*3/MM3 (ref 0–0.2)
BASOPHILS NFR BLD AUTO: 0.9 % (ref 0–1.5)
BILIRUB SERPL-MCNC: 0.8 MG/DL (ref 0–1.2)
BUN SERPL-MCNC: 9 MG/DL (ref 6–20)
BUN/CREAT SERPL: 16.7 (ref 7–25)
CALCIUM SERPL-MCNC: 9.4 MG/DL (ref 8.6–10.5)
CHLORIDE SERPL-SCNC: 99 MMOL/L (ref 98–107)
CHOLEST SERPL-MCNC: 141 MG/DL (ref 0–200)
CO2 SERPL-SCNC: 27.7 MMOL/L (ref 22–29)
CREAT SERPL-MCNC: 0.54 MG/DL (ref 0.57–1)
CREAT UR-MCNC: 76.5 MG/DL
EGFRCR SERPLBLD CKD-EPI 2021: 118.1 ML/MIN/1.73
EOSINOPHIL # BLD AUTO: 0.12 10*3/MM3 (ref 0–0.4)
EOSINOPHIL NFR BLD AUTO: 1.3 % (ref 0.3–6.2)
ERYTHROCYTE [DISTWIDTH] IN BLOOD BY AUTOMATED COUNT: 13.1 % (ref 12.3–15.4)
FERRITIN SERPL-MCNC: 160 NG/ML (ref 13–150)
GLOBULIN SER CALC-MCNC: 2.6 GM/DL
GLUCOSE SERPL-MCNC: 277 MG/DL (ref 65–99)
HBA1C MFR BLD: 6.7 % (ref 4.8–5.6)
HBV SURFACE AB SER QL: NON REACTIVE
HCT VFR BLD AUTO: 41.9 % (ref 34–46.6)
HDLC SERPL-MCNC: 43 MG/DL (ref 40–60)
HGB BLD-MCNC: 13.8 G/DL (ref 12–15.9)
IMM GRANULOCYTES # BLD AUTO: 0.03 10*3/MM3 (ref 0–0.05)
IMM GRANULOCYTES NFR BLD AUTO: 0.3 % (ref 0–0.5)
IRON SATN MFR SERPL: 10 % (ref 20–50)
IRON SERPL-MCNC: 43 MCG/DL (ref 37–145)
LDLC SERPL CALC-MCNC: 72 MG/DL (ref 0–100)
LYMPHOCYTES # BLD AUTO: 2.63 10*3/MM3 (ref 0.7–3.1)
LYMPHOCYTES NFR BLD AUTO: 28.2 % (ref 19.6–45.3)
MCH RBC QN AUTO: 28.1 PG (ref 26.6–33)
MCHC RBC AUTO-ENTMCNC: 32.9 G/DL (ref 31.5–35.7)
MCV RBC AUTO: 85.3 FL (ref 79–97)
MICROALBUMIN UR-MCNC: 28.2 UG/ML
MONOCYTES # BLD AUTO: 0.63 10*3/MM3 (ref 0.1–0.9)
MONOCYTES NFR BLD AUTO: 6.8 % (ref 5–12)
NEUTROPHILS # BLD AUTO: 5.82 10*3/MM3 (ref 1.7–7)
NEUTROPHILS NFR BLD AUTO: 62.5 % (ref 42.7–76)
NRBC BLD AUTO-RTO: 0 /100 WBC (ref 0–0.2)
PLATELET # BLD AUTO: 311 10*3/MM3 (ref 140–450)
POTASSIUM SERPL-SCNC: 4.4 MMOL/L (ref 3.5–5.2)
PROT SERPL-MCNC: 6.8 G/DL (ref 6–8.5)
RBC # BLD AUTO: 4.91 10*6/MM3 (ref 3.77–5.28)
SODIUM SERPL-SCNC: 136 MMOL/L (ref 136–145)
TIBC SERPL-MCNC: 417 MCG/DL
TRIGL SERPL-MCNC: 152 MG/DL (ref 0–150)
TSH SERPL DL<=0.005 MIU/L-ACNC: 1.15 UIU/ML (ref 0.27–4.2)
UIBC SERPL-MCNC: 374 MCG/DL (ref 112–346)
VIT B12 SERPL-MCNC: 194 PG/ML (ref 211–946)
VLDLC SERPL CALC-MCNC: 26 MG/DL (ref 5–40)
WBC # BLD AUTO: 9.31 10*3/MM3 (ref 3.4–10.8)

## 2023-11-20 LAB
BACTERIA UR CULT: ABNORMAL
BACTERIA UR CULT: ABNORMAL
OTHER ANTIBIOTIC SUSC ISLT: ABNORMAL

## 2023-11-21 RX ORDER — BACLOFEN 10 MG/1
TABLET ORAL
Qty: 90 TABLET | Refills: 1 | Status: SHIPPED | OUTPATIENT
Start: 2023-11-21

## 2023-11-22 DIAGNOSIS — F41.1 GENERALIZED ANXIETY DISORDER: ICD-10-CM

## 2023-11-22 DIAGNOSIS — F43.10 POST TRAUMATIC STRESS DISORDER (PTSD): ICD-10-CM

## 2023-11-22 DIAGNOSIS — F33.0 MILD EPISODE OF RECURRENT MAJOR DEPRESSIVE DISORDER: ICD-10-CM

## 2023-11-22 RX ORDER — FLUOXETINE 10 MG/1
10 CAPSULE ORAL DAILY
Qty: 30 CAPSULE | Refills: 2 | Status: SHIPPED | OUTPATIENT
Start: 2023-11-22

## 2023-11-22 NOTE — TELEPHONE ENCOUNTER
Pt called and stated that she was unable to  Prozac because pharmacy had informed her she had no refills left. Called and spoke with pharmacy. Pharmacy stated that the last refill was done on 10/23/2023. Stated that refill that was sent in on 09/29/2023 was never received by them. Resent script.     Rx Refill Note  Requested Prescriptions     Pending Prescriptions Disp Refills    FLUoxetine (PROzac) 10 MG capsule 30 capsule 2     Sig: Take 1 capsule by mouth Daily.      Last office visit with prescribing clinician: 10/27/2022   Last telemedicine visit with prescribing clinician: 9/29/2023   Next office visit with prescribing clinician: Visit date not found                         Would you like a call back once the refill request has been completed: [] Yes [] No    If the office needs to give you a call back, can they leave a voicemail: [] Yes [] No    Rush Rosas MA  11/22/23, 13:29 EST

## 2023-11-30 DIAGNOSIS — G56.03 BILATERAL CARPAL TUNNEL SYNDROME: ICD-10-CM

## 2023-11-30 DIAGNOSIS — M54.12 CERVICAL RADICULOPATHY: ICD-10-CM

## 2023-11-30 DIAGNOSIS — M50.30 DDD (DEGENERATIVE DISC DISEASE), CERVICAL: ICD-10-CM

## 2023-11-30 DIAGNOSIS — M51.36 DDD (DEGENERATIVE DISC DISEASE), LUMBAR: ICD-10-CM

## 2023-12-01 NOTE — TELEPHONE ENCOUNTER
Rx Refill Note  Requested Prescriptions     Pending Prescriptions Disp Refills    pregabalin (Lyrica) 200 MG capsule 90 capsule 1     Sig: Take 1 capsule by mouth 3 (Three) Times a Day.      Last office visit with prescribing clinician: 11/15/2023   Last telemedicine visit with prescribing clinician: Visit date not found   Next office visit with prescribing clinician: 2/19/2024                         Would you like a call back once the refill request has been completed: [] Yes [] No    If the office needs to give you a call back, can they leave a voicemail: [] Yes [] No    Lola Garcia MA  12/01/23, 12:49 EST

## 2023-12-04 RX ORDER — PREGABALIN 200 MG/1
200 CAPSULE ORAL 3 TIMES DAILY
Qty: 90 CAPSULE | Refills: 1 | Status: SHIPPED | OUTPATIENT
Start: 2023-12-04

## 2023-12-10 DIAGNOSIS — E61.1 IRON DEFICIENCY: ICD-10-CM

## 2023-12-11 DIAGNOSIS — E55.9 VITAMIN D DEFICIENCY: ICD-10-CM

## 2023-12-11 RX ORDER — FERROUS GLUCONATE 324(38)MG
324 TABLET ORAL 2 TIMES DAILY WITH MEALS
Qty: 60 TABLET | Refills: 0 | Status: SHIPPED | OUTPATIENT
Start: 2023-12-11

## 2023-12-11 RX ORDER — ERGOCALCIFEROL 1.25 MG/1
1 CAPSULE ORAL
Qty: 4 CAPSULE | Refills: 2 | Status: SHIPPED | OUTPATIENT
Start: 2023-12-11

## 2023-12-13 DIAGNOSIS — G89.29 CHRONIC NECK PAIN: ICD-10-CM

## 2023-12-13 DIAGNOSIS — M43.6 ACQUIRED TORTICOLLIS: ICD-10-CM

## 2023-12-13 DIAGNOSIS — M50.30 DDD (DEGENERATIVE DISC DISEASE), CERVICAL: ICD-10-CM

## 2023-12-13 DIAGNOSIS — M54.2 CHRONIC NECK PAIN: ICD-10-CM

## 2023-12-13 DIAGNOSIS — M54.12 CERVICAL RADICULOPATHY: ICD-10-CM

## 2023-12-13 DIAGNOSIS — M51.36 DDD (DEGENERATIVE DISC DISEASE), LUMBAR: Primary | ICD-10-CM

## 2023-12-19 DIAGNOSIS — F51.5 NIGHTMARES: ICD-10-CM

## 2023-12-19 DIAGNOSIS — E55.9 VITAMIN D DEFICIENCY: ICD-10-CM

## 2023-12-19 RX ORDER — ERGOCALCIFEROL 1.25 MG/1
1 CAPSULE ORAL
Qty: 4 CAPSULE | Refills: 2 | Status: SHIPPED | OUTPATIENT
Start: 2023-12-19

## 2023-12-19 RX ORDER — PRAZOSIN HYDROCHLORIDE 1 MG/1
1 CAPSULE ORAL NIGHTLY
Qty: 30 CAPSULE | Refills: 2 | Status: SHIPPED | OUTPATIENT
Start: 2023-12-19

## 2023-12-19 RX ORDER — ERGOCALCIFEROL 1.25 MG/1
50000 CAPSULE ORAL
Qty: 4 CAPSULE | Refills: 1 | OUTPATIENT
Start: 2023-12-19

## 2023-12-19 NOTE — TELEPHONE ENCOUNTER
Rx Refill Note  Requested Prescriptions     Pending Prescriptions Disp Refills    prazosin (MINIPRESS) 1 MG capsule 30 capsule 2     Sig: Take 1 capsule by mouth Every Night.      Last office visit with prescribing clinician: 10/27/2022   Last telemedicine visit with prescribing clinician: 9/29/2023   Next office visit with prescribing clinician: 1/8/2024                         Would you like a call back once the refill request has been completed: [] Yes [] No    If the office needs to give you a call back, can they leave a voicemail: [] Yes [] No    Mandy Bradshaw CMA  12/19/23, 14:36 EST

## 2024-01-08 ENCOUNTER — TELEMEDICINE (OUTPATIENT)
Dept: PSYCHIATRY | Facility: CLINIC | Age: 44
End: 2024-01-08
Payer: MEDICAID

## 2024-01-08 DIAGNOSIS — F43.10 POST TRAUMATIC STRESS DISORDER (PTSD): ICD-10-CM

## 2024-01-08 DIAGNOSIS — E61.1 IRON DEFICIENCY: ICD-10-CM

## 2024-01-08 DIAGNOSIS — F41.1 GENERALIZED ANXIETY DISORDER: Primary | ICD-10-CM

## 2024-01-08 DIAGNOSIS — F33.1 MODERATE EPISODE OF RECURRENT MAJOR DEPRESSIVE DISORDER: ICD-10-CM

## 2024-01-08 PROCEDURE — 1160F RVW MEDS BY RX/DR IN RCRD: CPT | Performed by: NURSE PRACTITIONER

## 2024-01-08 PROCEDURE — 99214 OFFICE O/P EST MOD 30 MIN: CPT | Performed by: NURSE PRACTITIONER

## 2024-01-08 PROCEDURE — 1159F MED LIST DOCD IN RCRD: CPT | Performed by: NURSE PRACTITIONER

## 2024-01-08 RX ORDER — FERROUS GLUCONATE 324(38)MG
324 TABLET ORAL 2 TIMES DAILY WITH MEALS
Qty: 60 TABLET | Refills: 0 | Status: CANCELLED | OUTPATIENT
Start: 2024-01-08

## 2024-01-08 RX ORDER — FLUOXETINE HYDROCHLORIDE 20 MG/1
20 CAPSULE ORAL DAILY
Qty: 30 CAPSULE | Refills: 2 | Status: SHIPPED | OUTPATIENT
Start: 2024-01-08

## 2024-01-08 NOTE — PROGRESS NOTES
This provider is located at [facility] ,[address], [address] [facility] in [place] [address], using a secure [employer] Video Visit through [employer]. Patient is being seen remotely via telehealth at their home address in [place] [address], and stated they are in a secure environment for this session. The patient's condition being diagnosed/treated is appropriate for telemedicine. The provider identified herself as well as her credentials. The patient, and/or patients guardian, consent to be seen remotely, and when consent is given they understand that the consent allows for patient identifiable information to be sent to a third party as needed.   They may refuse to be seen remotely at any time. The electronic data is encrypted and password protected, and the patient and/or guardian has been advised of the potential risks to privacy not withstanding such measures.     You have chosen to receive care through a telehealth visit.  Do you consent to use a video/audio connection for your medical care today? Yes    Subjective   Clementine Johnson is a 43 y.o. female who presents today for follow up    Chief Complaint: Depression and anxiety    History of Present Illness:   History of Present Illness  Clementine Johnson presents today for medication management follow-up via MyChart video visit.  Currently taking Prozac 10 mg daily, prazosin 1 mg nightly and buspirone 15 mg 4 times daily.  Voices increase in overall anxiety and depression as she was denied for her disability.  Has reapplied and will have upcoming interview via phone next week.  She did see neurology, officially diagnosed with cervical dystonia and obtained Botox injections.  Has not been able to work due to the issues with her neck, she is forced to physically hold her head straight to look at people.  Says that she is embarrassed by this physical condition causing increased depression and anxiety.  Her involuntary head  movements/spasms have gotten progressively worse and says that driving has become difficult as well.  Feels that her quality of life have been negatively impacted as her physical limitations have gotten worse with time.  Rates current depression 8-9/10, rates anxiety 8-9/10 on a 0-10 scale with 10 being the worst.  Sleep is typically poor, obtains about 3 to 4 hours per night on average.  Says that she wakes during the night f due to muscle stiffness and pain.  Reports appetite is good.  Doing well in MAT program.  Adamantly denies any SI/HI.     Previous Psych Meds: Zoloft, Paxil, Xanax, hydroxyzine.  Currently taking Prozac (x 5 years), buspirone (x 5 years) and prazosin       The following portions of the patient's history were reviewed and updated as appropriate: allergies, current medications, past family history, past medical history, past social history, past surgical history and problem list.      Past Medical History:  Past Medical History:   Diagnosis Date    Anxiety     Arthritis     DDD (degenerative disc disease), cervical     DDD (degenerative disc disease), lumbar     Depression     Diabetes mellitus     Ear piercing     Gestational diabetes     HPV (human papilloma virus) infection     Lower back pain     PMS (premenstrual syndrome)     Pregnancy     A0 s/p CS x 1    Seasonal allergies     Substance abuse     Toxemia in pregnancy     Trauma     Varicella     Wears contact lenses     Wears glasses        Social History:  Social History     Socioeconomic History    Marital status: Legally    Tobacco Use    Smoking status: Every Day     Packs/day: 1.00     Years: 22.00     Additional pack years: 0.00     Total pack years: 22.00     Types: Cigarettes     Start date: 1995     Passive exposure: Current    Smokeless tobacco: Never   Vaping Use    Vaping Use: Never used   Substance and Sexual Activity    Alcohol use: No    Drug use: Not Currently     Types: Benzodiazepines, Hydrocodone,  "Marijuana, Oxycodone     Comment: last use for any pills has been \"years\".  Last marijuana use was 5-6 months ago (assessed 10/17/22)    Sexual activity: Defer       Family History:  Family History   Problem Relation Age of Onset    Arthritis Mother     Arthritis Father     Arthritis Maternal Aunt     Stroke Paternal Uncle     Arthritis Paternal Grandmother     Stroke Maternal Grandmother        Past Surgical History:  Past Surgical History:   Procedure Laterality Date     SECTION      x1    COLPOSCOPY N/A 10/31/2022    Procedure: COLPOSCOPY OF THE VULVA, WIDE LOCAL EXCISION OF VULVAR LESIONS TIMES FIVE;  Surgeon: Nelson Copeland MD;  Location: [facility];  Service: Obstetrics/Gynecology;  Laterality: N/A;    TUBAL COAGULATION LAPAROSCOPIC N/A 2023    Procedure: LAPAROSCOPIC TUBAL LIGATION, DRAINAGE OF LEFT OVARIAN CYST, HYSTEROSCOPY, DILATION AND CURETTAGE, ENDOMETRIAL ABLATION WITH NOVASURE;  Surgeon: Nelson Copeland MD;  Location: [facility] OR;  Service: Obstetrics/Gynecology;  Laterality: N/A;    VULVA BIOPSY N/A 3/3/2023    Procedure: Colposcopy of vulva, Wide local excision of vulvar lesion;  Surgeon: Nelson Copeland MD;  Location: [facility] OR;  Service: Obstetrics/Gynecology;  Laterality: N/A;    WISDOM TOOTH EXTRACTION         Problem List:  Patient Active Problem List   Diagnosis    Depression with anxiety    Meralgia paresthetica of left side    Vitamin D deficiency    Type 2 diabetes mellitus with hyperglycemia, without long-term current use of insulin    Chronic neck pain    Cervical radiculopathy    Seasonal allergic rhinitis due to pollen    Bronchospasm    Essential hypertension    Situational anxiety    DDD (degenerative disc disease), cervical    DDD (degenerative disc disease), lumbar    Bilateral carpal tunnel syndrome    Chronic contact dermatitis    Abnormal uterine bleeding (AUB)    Genital warts    Vulvar intraepithelial neoplasia (FAN) grade 3    Morbid obesity    " Posttraumatic torticollis    Dependent edema    S/P tubal ligation    S/P endometrial ablation    Chronic pain syndrome       Allergy:   Allergies   Allergen Reactions    Nsaids Rash and Swelling     Swelling in the legs/feet    Codeine Rash    Erythromycin Rash    Erythromycin Base Rash    Tramadol Rash        Current Medications:   Current Outpatient Medications   Medication Sig Dispense Refill    B-D UF III MINI PEN NEEDLES 31G X 5 MM misc FOR USE WITH INSULIN PENS. FOLLOW INSTRUCTIONS ON INSULIN PENS. 100 each 4    baclofen (LIORESAL) 10 MG tablet TAKE 1-2 BY MOUTH THREE TIMES DAILY AS NEEDED FOR MUSCLE SPASM 90 tablet 1    buprenorphine-naloxone (SUBOXONE) 8-2 MG per SL tablet PLACE 2 TABLET UNDER TONGUE ONCE A DAY      busPIRone (BUSPAR) 15 MG tablet Take 1 tablet by mouth 4 (Four) Times a Day. 120 tablet 2    enalapril (VASOTEC) 5 MG tablet Take 1 tablet by mouth Daily. 30 tablet 11    ergocalciferol (ERGOCALCIFEROL) 1.25 MG (10112 UT) capsule Take 1 capsule by mouth Every 7 (Seven) Days. 4 capsule 2    ferrous gluconate (FERGON) 324 MG tablet Take 1 tablet by mouth 2 (Two) Times a Day With Meals. Or take with vitamin C. 60 tablet 0    fluconazole (Diflucan) 150 MG tablet Take one tablet today, repeat in 3 days 2 tablet 0    FLUoxetine (PROzac) 10 MG capsule Take 1 capsule by mouth Daily. 30 capsule 2    glipizide (GLUCOTROL) 5 MG tablet Take 1 tablet by mouth 2 (Two) Times a Day Before Meals. 60 tablet 11    glucose blood (ONE TOUCH ULTRA TEST) test strip Check blood sugar twice a day. 100 each 2    glucose monitor monitoring kit 1 each 2 (Two) Times a Day 1 each 0    hydrOXYzine (ATARAX) 25 MG tablet Take 1 tablet by mouth Every 6 (Six) Hours As Needed.  0    metFORMIN ER (GLUCOPHAGE-XR) 500 MG 24 hr tablet Take 2 tablets by mouth Daily With Breakfast. 60 tablet 11    ONETOUCH DELICA LANCETS 33G misc Inject 1 each under the skin into the appropriate area as directed 2 (Two) Times a Day. 100 each 11     prazosin (MINIPRESS) 1 MG capsule Take 1 capsule by mouth Every Night. 30 capsule 2    pregabalin (Lyrica) 200 MG capsule Take 1 capsule by mouth 3 (Three) Times a Day. 90 capsule 1    SITagliptin (Januvia) 100 MG tablet Take 1 tablet by mouth Daily. 30 tablet 11     No current facility-administered medications for this visit.     Review of Systems   Constitutional:  Negative for activity change, appetite change, unexpected weight gain and unexpected weight loss.   Respiratory:  Negative for shortness of breath.    Cardiovascular:  Negative for chest pain.   Musculoskeletal:  Positive for neck pain and neck stiffness.   Psychiatric/Behavioral:  Positive for sleep disturbance and depressed mood. Negative for suicidal ideas. The patient is nervous/anxious.      Physical Exam  Constitutional:       General: She is not in acute distress.     Appearance: Normal appearance.   Neurological:      Mental Status: She is alert.       Vitals:   The patient [facility].  Unable to obtain vital signs due to nature of remote visit.    Mental Status Exam:   Hygiene:    Appears good  Cooperation:  Cooperative  Eye Contact:   ANA r/t video visit  Psychomotor Behavior:  Appropriate  Affect:  Appropriate  Mood: normal  Hopelessness: Denies  Speech:  Normal  Thought Process:  Goal directed and Linear  Thought Content:  Mood congruent  Suicidal:  None  Homicidal:  None  Hallucinations:  None  Delusion:  None  Memory:  Intact  Orientation:  Person, Place, Time, and Situation  Reliability:  good  Insight:  Good  Judgement:  Good  Impulse Control:  Good    Lab Results:   Office Visit on 11/15/2023   Component Date Value Ref Range Status    Color 11/15/2023 Yellow  Yellow, Straw, Dark Yellow, Camille Final    Clarity, UA 11/15/2023 Cloudy (A)  Clear Final    Specific Gravity  11/15/2023 1.020  1.005 - 1.030 Final    pH, Urine 11/15/2023 6.0  5.0 - 8.0 Final    Leukocytes 11/15/2023 Trace (A)   Negative Final    Nitrite, UA 11/15/2023 Negative  Negative Final    Protein, POC 11/15/2023 Negative  Negative mg/dL Final    Glucose, UA 11/15/2023 Negative  Negative mg/dL Final    Ketones, UA 11/15/2023 Negative  Negative Final    Urobilinogen, UA 11/15/2023 0.2 E.U./dL  Normal, 0.2 E.U./dL Final    Bilirubin 11/15/2023 Negative  Negative Final    Blood, UA 11/15/2023 1+ (A)  Negative Final    Lot Number 11/15/2023 [id number]   Final    Expiration Date 11/15/2023 07/13/24   Final    Creatinine, Urine 11/15/2023 76.5  Not Estab. mg/dL Final    Microalbumin, Urine 11/15/2023 28.2  Not Estab. ug/mL Final    Microalbumin/Creatinine Ratio 11/15/2023 37 (H)  0 - 29 mg/g creat Final    Comment:                        Normal:                0 -  29                         Moderately increased: 30 - 300                         Severely increased:       >300      Hemoglobin A1C 11/15/2023 6.70 (H)  4.80 - 5.60 % Final    Comment: Hemoglobin A1C Ranges:  Increased Risk for Diabetes  5.7% to 6.4%  Diabetes                     >= 6.5%  Diabetic Goal                < 7.0%      Glucose 11/15/2023 277 (H)  65 - 99 mg/dL Final    BUN 11/15/2023 9  6 - 20 mg/dL Final    Creatinine 11/15/2023 0.54 (L)  0.57 - 1.00 mg/dL Final    EGFR Result 11/15/2023 118.1  >60.0 mL/min/1.73 Final    Comment: GFR Normal >60  Chronic Kidney Disease <60  Kidney Failure <15      BUN/Creatinine Ratio 11/15/2023 16.7  7.0 - 25.0 Final    Sodium 11/15/2023 136  136 - 145 mmol/L Final    Potassium 11/15/2023 4.4  3.5 - 5.2 mmol/L Final    Chloride 11/15/2023 99  98 - 107 mmol/L Final    Total CO2 11/15/2023 27.7  22.0 - 29.0 mmol/L Final    Calcium 11/15/2023 9.4  8.6 - 10.5 mg/dL Final    Total Protein 11/15/2023 6.8  6.0 - 8.5 g/dL Final    Albumin 11/15/2023 4.2  3.5 - 5.2 g/dL Final    Globulin 11/15/2023 2.6  gm/dL Final    A/G Ratio 11/15/2023 1.6  g/dL Final    Total Bilirubin 11/15/2023 0.8  0.0 - 1.2 mg/dL Final    Alkaline Phosphatase  11/15/2023 88  39 - 117 U/L Final    AST (SGOT) 11/15/2023 10  1 - 32 U/L Final    ALT (SGPT) 11/15/2023 11  1 - 33 U/L Final    Hep B S Ab 11/15/2023 Non Reactive   Final    Comment:               Non Reactive: Inconsistent with immunity,                              less than 10 mIU/mL                Reactive:     Consistent with immunity,                              greater than 9.9 mIU/mL      Total Cholesterol 11/15/2023 141  0 - 200 mg/dL Final    Comment: Cholesterol Reference Ranges  (U.S. Department of Health and Human Services ATP III  Classifications)  Desirable          <200 mg/dL  Borderline High    200-239 mg/dL  High Risk          >240 mg/dL  Triglyceride Reference Ranges  (U.S. Department of Health and Human Services ATP III  Classifications)  Normal           <150 mg/dL  Borderline High  150-199 mg/dL  High             200-499 mg/dL  Very High        >500 mg/dL  HDL Reference Ranges  (U.S. Department of Health and Human Services ATP III  Classifications)  Low     <40 mg/dl (major risk factor for CHD)  High    >60 mg/dl ('negative' risk factor for CHD)  LDL Reference Ranges  (U.S. Department of Health and Human Services ATP III  Classifications)  Optimal          <100 mg/dL  Near Optimal     100-129 mg/dL  Borderline High  130-159 mg/dL  High             160-189 mg/dL  Very High        >189 mg/dL      Triglycerides 11/15/2023 152 (H)  0 - 150 mg/dL Final    HDL Cholesterol 11/15/2023 43  40 - 60 mg/dL Final    VLDL Cholesterol Per 11/15/2023 26  5 - 40 mg/dL Final    LDL Chol Calc (NIH) 11/15/2023 72  0 - 100 mg/dL Final    TSH 11/15/2023 1.150  0.270 - 4.200 uIU/mL Final    TIBC 11/15/2023 417  mcg/dL Final    UIBC 11/15/2023 374 (H)  112 - 346 mcg/dL Final    Iron 11/15/2023 43  37 - 145 mcg/dL Final    Iron Saturation 11/15/2023 10 (L)  20 - 50 % Final    Ferritin 11/15/2023 160.00 (H)  13.00 - 150.00 ng/mL Final    Results may be falsely decreased if patient taking Biotin.    Vitamin B-12  11/15/2023 194 (L)  211 - 946 pg/mL Final    Results may be falsely increased if patient taking Biotin.    WBC 11/15/2023 9.31  3.40 - 10.80 10*3/mm3 Final    RBC 11/15/2023 4.91  3.77 - 5.28 10*6/mm3 Final    Hemoglobin 11/15/2023 13.8  12.0 - 15.9 g/dL Final    Hematocrit 11/15/2023 41.9  34.0 - 46.6 % Final    MCV 11/15/2023 85.3  79.0 - 97.0 fL Final    MCH 11/15/2023 28.1  26.6 - 33.0 pg Final    MCHC 11/15/2023 32.9  31.5 - 35.7 g/dL Final    RDW 11/15/2023 13.1  12.3 - 15.4 % Final    Platelets 11/15/2023 311  140 - 450 10*3/mm3 Final    Neutrophil Rel % 11/15/2023 62.5  42.7 - 76.0 % Final    Lymphocyte Rel % 11/15/2023 28.2  19.6 - 45.3 % Final    Monocyte Rel % 11/15/2023 6.8  5.0 - 12.0 % Final    Eosinophil Rel % 11/15/2023 1.3  0.3 - 6.2 % Final    Basophil Rel % 11/15/2023 0.9  0.0 - 1.5 % Final    Neutrophils Absolute 11/15/2023 5.82  1.70 - 7.00 10*3/mm3 Final    Lymphocytes Absolute 11/15/2023 2.63  0.70 - 3.10 10*3/mm3 Final    Monocytes Absolute 11/15/2023 0.63  0.10 - 0.90 10*3/mm3 Final    Eosinophils Absolute 11/15/2023 0.12  0.00 - 0.40 10*3/mm3 Final    Basophils Absolute 11/15/2023 0.08  0.00 - 0.20 10*3/mm3 Final    Immature Granulocyte Rel % 11/15/2023 0.3  0.0 - 0.5 % Final    Immature Grans Absolute 11/15/2023 0.03  0.00 - 0.05 10*3/mm3 Final    nRBC 11/15/2023 0.0  0.0 - 0.2 /100 WBC Final    Urine Culture 11/15/2023 Final report (A)   Final    Result 1 11/15/2023 Comment (A)   Final    Comment: Coagulase negative Staphylococcus species, not Staphylococcus  saprophyticus.  Greater than 100,000 colony forming units per mL  Based on susceptibility to oxacillin this isolate would be  susceptible to:  *Penicillinase-stable penicillins, such as:    Cloxacillin, Dicloxacillin, Nafcillin  *Beta-lactam combination agents, such as:    Amoxicillin-clavulanic acid, Ampicillin-sulbactam,    Piperacillin-tazobactam  *Oral cephems, such as:    Cefaclor, Cefdinir, Cefpodoxime, Cefprozil, Cefuroxime,     Cephalexin, Loracarbef  *Parenteral cephems, such as:    Cefazolin, Cefepime, Cefotaxime, Cefotetan, Ceftaroline,    Ceftizoxime, Ceftriaxone, Cefuroxime  *Carbapenems, such as:    Doripenem, Ertapenem, Imipenem, Meropenem      Susceptibility Testing 11/15/2023 Comment   Final    Comment:       ** S = Susceptible; I = Intermediate; R = Resistant **                     P = Positive; N = Negative              MICS are expressed in micrograms per mL     Antibiotic                 RSLT#1    RSLT#2    RSLT#3    RSLT#4  Ciprofloxacin                  S  Gentamicin                     S  Levofloxacin                   S  Linezolid                      S  Moxifloxacin                   S  Nitrofurantoin                 S  Oxacillin                      S  Penicillin                     R  Quinupristin/Dalfopristin      S  Rifampin                       S  Tetracycline                   S  Trimethoprim/Sulfa             S  Vancomycin                     S     Office Visit on 10/26/2023   Component Date Value Ref Range Status    Rapid Strep A Screen 10/26/2023 Negative  Negative, VALID, INVALID, Not Performed Final    Internal Control 10/26/2023 Passed  Passed Final    Lot Number 10/26/2023 [id number]   Final    Expiration Date 10/26/2023 9/12/2024   Final       EKG Results:  No orders to display       Assessment & Plan   Problems Addressed this Visit    None  Visit Diagnoses       Generalized anxiety disorder    -  Primary    Post traumatic stress disorder (PTSD)        Moderate episode of recurrent major depressive disorder              Diagnoses         Codes Comments    Generalized anxiety disorder    -  Primary ICD-10-CM: F41.1  ICD-9-CM: 300.02     Post traumatic stress disorder (PTSD)     ICD-10-CM: F43.10  ICD-9-CM: 309.81     Moderate episode of recurrent major depressive disorder     ICD-10-CM: F33.1  ICD-9-CM: 296.32             Visit Diagnoses:    ICD-10-CM ICD-9-CM   1. Generalized anxiety disorder  F41.1 300.02    2. Post traumatic stress disorder (PTSD)  F43.10 309.81   3. Moderate episode of recurrent major depressive disorder  F33.1 296.32     Clementine [facility].  Reports that she is doing well with current medication regimen, but has struggled with increasing anxiety and depression.  Some of her symptoms are likely related to chronic condition getting progressively worse.  Also has other situational stressors that play a role in worsening symptoms.  Discussed medication regimen and options, agreeable to increase Prozac from 10 mg to 20 mg daily to help with better management of overall anxiety and depression.  We will continue with prazosin 1 mg nightly and buspirone 15 mg 4 times daily.  Denies any adverse effects associated with medication regimen.    -Increase Prozac from 10 mg to 20 mg daily for anxiety and depression  -Refill prazosin 1 mg at night for nightmares/sleep  -Refill buspirone 15 mg 4 times daily for anxiety    -Reviewed previous available documentation and most recent available labs. JESSIE reviewed and is appropriate.      GOALS:  Short Term Goals: Patient will be compliant with medication, and patient will have no significant medication related side effects.  Patient will be engaged in psychotherapy as indicated.  Patient will report subjective improvement of symptoms.  Long term goals: To stabilize mood and treat/improve subjective symptoms, the patient will stay out of the hospital, the patient will be at an optimal level of functioning, and the patient will take all medications as prescribed.  The patient/guardian verbalized understanding and agreement with goals that were mutually set.    TREATMENT PLAN: Continue supportive psychotherapy efforts and medications as indicated for patient's diagnosis.  Pharmacological and Non-Pharmacological treatment options discussed during today's visit. Patient/Guardian acknowledged and verbally  consented with current treatment plan and was educated on the importance of compliance with treatment and follow-up appointments.      MEDICATION ISSUES:  Discussed medication options and treatment plan of prescribed medication as well as the risks, benefits, any black box warnings, and side effects including potential falls, possible impaired driving, and metabolic adversities among others. Patient is agreeable to call the office with any worsening of symptoms or onset of side effects, or if any concerns or questions arise.  The contact information for the office is made available to the patient. Patient is agreeable to call 911 or go to the nearest ER should they begin having any SI/HI, or if any urgent concerns arise. No medication side effects or related complaints today.     MEDS ORDERED DURING VISIT:  No orders of the defined types were placed in this encounter.      FOLLOW UP:  Return in about 8 weeks (around 3/4/2024).             This document has been electronically signed by SHANELLE Galvez  January 8, 2024 11:16 EST    Please note that portions of this note were completed with a voice recognition program. Efforts were made to edit dictation, but occasionally words are mistranscribed.

## 2024-01-11 ENCOUNTER — TELEPHONE (OUTPATIENT)
Dept: FAMILY MEDICINE CLINIC | Facility: CLINIC | Age: 44
End: 2024-01-11

## 2024-01-11 DIAGNOSIS — M54.12 CERVICAL RADICULOPATHY: ICD-10-CM

## 2024-01-11 DIAGNOSIS — E61.1 IRON DEFICIENCY: ICD-10-CM

## 2024-01-11 DIAGNOSIS — M51.36 DDD (DEGENERATIVE DISC DISEASE), LUMBAR: ICD-10-CM

## 2024-01-11 DIAGNOSIS — G56.03 BILATERAL CARPAL TUNNEL SYNDROME: ICD-10-CM

## 2024-01-11 DIAGNOSIS — M50.30 DDD (DEGENERATIVE DISC DISEASE), CERVICAL: ICD-10-CM

## 2024-01-11 RX ORDER — FERROUS GLUCONATE 324(38)MG
324 TABLET ORAL 2 TIMES DAILY WITH MEALS
Qty: 60 TABLET | Refills: 0 | Status: SHIPPED | OUTPATIENT
Start: 2024-01-11

## 2024-01-11 NOTE — TELEPHONE ENCOUNTER
Caller: Clementine Johnson    Relationship: Self    Best call back number: [phone]     Requested Prescriptions:   Requested Prescriptions     Pending Prescriptions Disp Refills    ferrous gluconate (FERGON) 324 MG tablet 60 tablet 0     Sig: Take 1 tablet by mouth 2 (Two) Times a Day With Meals. Or take with vitamin C.        Pharmacy where request should be sent: [facility] [place] [address] [address] - [phone]  - [phone]      Last office visit with prescribing clinician: 11/15/2023   Last telemedicine visit with prescribing clinician: Visit date not found   Next office visit with prescribing clinician: 2/19/2024     Additional details provided by patient: PATIENT IS COMPLETELY OUT OF THIS MEDICATION        Does the patient have less than a 3 day supply:  [x] Yes  [] No    Would you like a call back once the refill request has been completed: [] Yes [x] No    If the office needs to give you a call back, can they leave a voicemail: [] Yes [x] No    Jose Alejandro Nur Rep   01/11/24 12:37 EST

## 2024-01-11 NOTE — TELEPHONE ENCOUNTER
Rx Refill Note  Requested Prescriptions     Pending Prescriptions Disp Refills    pregabalin (LYRICA) 200 MG capsule [Pharmacy Med Name: PREGABALIN 200MG CAP] 90 capsule 0     Sig: TAKE 1 CAPSULE BY MOUTH 3 (THREE) TIMES A DAY.      Last office visit with prescribing clinician: 11/15/2023   Last telemedicine visit with prescribing clinician: Visit date not found   Next office visit with prescribing clinician: 1/11/2024                         Would you like a call back once the refill request has been completed: [] Yes [] No    If the office needs to give you a call back, can they leave a voicemail: [] Yes [] No    Moncia Scherer MA  01/11/24, 16:53 EST

## 2024-01-15 RX ORDER — PREGABALIN 200 MG/1
200 CAPSULE ORAL 3 TIMES DAILY
Qty: 90 CAPSULE | Refills: 0 | OUTPATIENT
Start: 2024-01-15

## 2024-01-25 DIAGNOSIS — G56.03 BILATERAL CARPAL TUNNEL SYNDROME: ICD-10-CM

## 2024-01-25 DIAGNOSIS — M54.12 CERVICAL RADICULOPATHY: ICD-10-CM

## 2024-01-25 DIAGNOSIS — M51.36 DDD (DEGENERATIVE DISC DISEASE), LUMBAR: ICD-10-CM

## 2024-01-25 DIAGNOSIS — M50.30 DDD (DEGENERATIVE DISC DISEASE), CERVICAL: ICD-10-CM

## 2024-01-25 RX ORDER — PREGABALIN 200 MG/1
200 CAPSULE ORAL 3 TIMES DAILY
Qty: 90 CAPSULE | Refills: 0 | Status: SHIPPED | OUTPATIENT
Start: 2024-01-25

## 2024-01-25 NOTE — TELEPHONE ENCOUNTER
Rx Refill Note  Requested Prescriptions     Pending Prescriptions Disp Refills    pregabalin (Lyrica) 200 MG capsule 90 capsule 1     Sig: Take 1 capsule by mouth 3 (Three) Times a Day.      Last office visit with prescribing clinician: 11/15/2023   Last telemedicine visit with prescribing clinician: Visit date not found   Next office visit with prescribing clinician: 2/19/2024                         Would you like a call back once the refill request has been completed: [] Yes [] No    If the office needs to give you a call back, can they leave a voicemail: [] Yes [] No    Monica Scherer MA  01/25/24, 13:45 EST

## 2024-02-09 DIAGNOSIS — E61.1 IRON DEFICIENCY: ICD-10-CM

## 2024-02-09 RX ORDER — FERROUS GLUCONATE 324(38)MG
324 TABLET ORAL 2 TIMES DAILY WITH MEALS
Qty: 60 TABLET | Refills: 0 | Status: SHIPPED | OUTPATIENT
Start: 2024-02-09

## 2024-02-19 ENCOUNTER — OFFICE VISIT (OUTPATIENT)
Dept: FAMILY MEDICINE CLINIC | Facility: CLINIC | Age: 44
End: 2024-02-19
Payer: MEDICAID

## 2024-02-19 VITALS
TEMPERATURE: 97.9 F | BODY MASS INDEX: 40.48 KG/M2 | HEART RATE: 82 BPM | OXYGEN SATURATION: 97 % | RESPIRATION RATE: 16 BRPM | SYSTOLIC BLOOD PRESSURE: 150 MMHG | WEIGHT: 220 LBS | DIASTOLIC BLOOD PRESSURE: 90 MMHG | HEIGHT: 62 IN

## 2024-02-19 DIAGNOSIS — I10 ESSENTIAL HYPERTENSION: ICD-10-CM

## 2024-02-19 DIAGNOSIS — M51.36 DDD (DEGENERATIVE DISC DISEASE), LUMBAR: ICD-10-CM

## 2024-02-19 DIAGNOSIS — R80.9 MICROALBUMINURIA: ICD-10-CM

## 2024-02-19 DIAGNOSIS — E53.8 B12 DEFICIENCY: ICD-10-CM

## 2024-02-19 DIAGNOSIS — M54.12 CERVICAL RADICULOPATHY: ICD-10-CM

## 2024-02-19 DIAGNOSIS — M50.30 DDD (DEGENERATIVE DISC DISEASE), CERVICAL: ICD-10-CM

## 2024-02-19 DIAGNOSIS — G56.03 BILATERAL CARPAL TUNNEL SYNDROME: ICD-10-CM

## 2024-02-19 DIAGNOSIS — Z23 NEED FOR HEPATITIS B BOOSTER VACCINATION: ICD-10-CM

## 2024-02-19 DIAGNOSIS — E55.9 VITAMIN D DEFICIENCY: ICD-10-CM

## 2024-02-19 DIAGNOSIS — L40.0 PLAQUE PSORIASIS: ICD-10-CM

## 2024-02-19 DIAGNOSIS — E11.65 TYPE 2 DIABETES MELLITUS WITH HYPERGLYCEMIA, WITHOUT LONG-TERM CURRENT USE OF INSULIN: Primary | ICD-10-CM

## 2024-02-19 LAB
EXPIRATION DATE: ABNORMAL
HBA1C MFR BLD: 7.1 % (ref 4.5–5.7)
Lab: ABNORMAL

## 2024-02-19 RX ORDER — CYANOCOBALAMIN 1000 UG/ML
1000 INJECTION, SOLUTION INTRAMUSCULAR; SUBCUTANEOUS
Status: SHIPPED | OUTPATIENT
Start: 2024-02-19 | End: 2024-04-01

## 2024-02-19 RX ORDER — PREGABALIN 200 MG/1
200 CAPSULE ORAL 3 TIMES DAILY
Qty: 90 CAPSULE | Refills: 2 | Status: SHIPPED | OUTPATIENT
Start: 2024-02-19

## 2024-02-19 RX ADMIN — CYANOCOBALAMIN 1000 MCG: 1000 INJECTION, SOLUTION INTRAMUSCULAR; SUBCUTANEOUS at 16:35

## 2024-02-19 NOTE — PROGRESS NOTES
Subjective   Clementine Johnson is a 43 y.o. female.     History of Present Illness  Here for routine f/u on chronic med problems.     On Lyrica for cervical radiculopathy pain and DPN.  Denies side effects. Not currently able to work.      BG under improved control. No longer needing insulin. On glipizide, metformin, januvia, ace-inh     Eye exam UTD.    Low B12, iron in past. Not currently on replacement.     HTN - on low does enalapril. BP noted to be high today.     Not immune to hep B.    Checking BG occ. In am - 140's, under 200 after eating.    Since last apt has seen GYN, had endometrial ablation.    Pt's previous HPI reviewed and updated as indicated.     The following portions of the patient's history were reviewed and updated as appropriate: allergies, current medications, past family history, past medical history, past social history, past surgical history, and problem list.    Review of Systems   Constitutional:  Positive for fatigue. Negative for chills, fever and unexpected weight change.   HENT:  Positive for congestion and postnasal drip. Negative for ear pain, mouth sores, nosebleeds, rhinorrhea, sinus pain, sore throat and trouble swallowing.    Eyes:  Negative for pain, discharge and visual disturbance.   Respiratory:  Negative for cough, shortness of breath and wheezing.    Cardiovascular:  Positive for leg swelling (stable). Negative for chest pain and palpitations.   Gastrointestinal:  Positive for diarrhea (mild, chronic, intermittent). Negative for abdominal pain, blood in stool, constipation, nausea and vomiting.   Endocrine: Positive for heat intolerance. Negative for polydipsia and polyuria.   Genitourinary:  Negative for dysuria and hematuria.   Musculoskeletal:  Positive for arthralgias, back pain, myalgias, neck pain and neck stiffness. Negative for joint swelling.   Skin:  Positive for rash. Negative for wound.   Neurological:  Positive for numbness and headaches. Negative for  dizziness, tremors, syncope and weakness.   Hematological:  Negative for adenopathy. Does not bruise/bleed easily.   Psychiatric/Behavioral:  Positive for dysphoric mood and sleep disturbance. Negative for confusion and suicidal ideas. The patient is nervous/anxious.    Pt's previous ROS reviewed and updated as indicated.       Objective   Vitals:    02/19/24 1552   BP: 150/90   Pulse: 82   Resp: 16   Temp: 97.9 °F (36.6 °C)   SpO2: 97%     Body mass index is 40.24 kg/m².      02/19/24  1552   Weight: 99.8 kg (220 lb)       Physical Exam  Vitals and nursing note reviewed.   Constitutional:       General: She is not in acute distress.     Appearance: She is obese. She is not ill-appearing.   HENT:      Head: Atraumatic.      Mouth/Throat:      Mouth: Mucous membranes are moist. No oral lesions.   Eyes:      General: No scleral icterus.     Conjunctiva/sclera: Conjunctivae normal.   Neck:      Thyroid: No thyroid mass.   Cardiovascular:      Rate and Rhythm: Normal rate and regular rhythm.      Pulses: Normal pulses.      Heart sounds: Normal heart sounds.   Pulmonary:      Effort: Pulmonary effort is normal.      Breath sounds: Normal breath sounds. No wheezing, rhonchi or rales.   Musculoskeletal:      Right lower leg: Edema (mild) present.      Left lower leg: Edema (mild) present.   Lymphadenopathy:      Cervical: No cervical adenopathy.   Skin:     General: Skin is warm and dry.      Coloration: Skin is not jaundiced or pale.      Findings: Rash (scaling patches on kness, elbows) present.   Neurological:      Mental Status: She is alert and oriented to person, place, and time.      Motor: No tremor.      Gait: Gait is intact.   Psychiatric:         Mood and Affect: Mood and affect normal.         Behavior: Behavior normal. Behavior is cooperative.         Cognition and Memory: Cognition normal.     Pt's previous physical exam reviewed and updated as indicated.    Lab Results   Component Value Date    HGBA1C 7.1  (A) 02/19/2024    HGBA1C 6.70 (H) 11/15/2023    HGBA1C 7.50 (H) 06/09/2023     Lab Results   Component Value Date    MICROALBUR 28.2 11/15/2023    CREATININE 0.54 (L) 11/15/2023     Lab Results   Component Value Date    WBC 9.31 11/15/2023    HGB 13.8 11/15/2023    HCT 41.9 11/15/2023    MCV 85.3 11/15/2023     11/15/2023       Lab Results   Component Value Date    GLUCOSE 277 (H) 11/15/2023    BUN 9 11/15/2023    CREATININE 0.54 (L) 11/15/2023    EGFRIFNONA 112 09/29/2021    EGFRIFAFRI 129 09/29/2021    BCR 16.7 11/15/2023    K 4.4 11/15/2023    CO2 27.7 11/15/2023    CALCIUM 9.4 11/15/2023    PROTENTOTREF 6.8 11/15/2023    ALBUMIN 4.2 11/15/2023    LABIL2 1.6 11/15/2023    AST 10 11/15/2023    ALT 11 11/15/2023       Lab Results   Component Value Date    CHLPL 141 11/15/2023    TRIG 152 (H) 11/15/2023    HDL 43 11/15/2023    LDL 72 11/15/2023     Lab Results   Component Value Date    TSH 1.150 11/15/2023           Assessment & Plan   Diagnoses and all orders for this visit:    1. Type 2 diabetes mellitus with hyperglycemia, without long-term current use of insulin (Primary)  -     POC Glycosylated Hemoglobin (Hb A1C)    2. DDD (degenerative disc disease), cervical  -     pregabalin (Lyrica) 200 MG capsule; Take 1 capsule by mouth 3 (Three) Times a Day.  Dispense: 90 capsule; Refill: 2    3. DDD (degenerative disc disease), lumbar  -     pregabalin (Lyrica) 200 MG capsule; Take 1 capsule by mouth 3 (Three) Times a Day.  Dispense: 90 capsule; Refill: 2    4. Cervical radiculopathy  -     pregabalin (Lyrica) 200 MG capsule; Take 1 capsule by mouth 3 (Three) Times a Day.  Dispense: 90 capsule; Refill: 2    5. Bilateral carpal tunnel syndrome  -     pregabalin (Lyrica) 200 MG capsule; Take 1 capsule by mouth 3 (Three) Times a Day.  Dispense: 90 capsule; Refill: 2    6. Need for hepatitis B booster vaccination  -     Hepatitis B Vaccine Adult IM (ENERGIX/RECOMBIVAX)    7. B12 deficiency  -     cyanocobalamin  injection 1,000 mcg    8. Plaque psoriasis  -     Ambulatory Referral to Dermatology    9. Microalbuminuria  -     enalapril (VASOTEC) 10 MG tablet; Take 1 tablet by mouth Daily.  Dispense: 30 tablet; Refill: 11    10. Essential hypertension    11. Vitamin D deficiency       NIDDM with A1c near goal. Encouraged to improve dietary habits. Continue metformin. Glipizide, januvia, ace-inh. She has declined statin. Patient encouraged to take meds as rx'd, follow diabetic appropriate diet, exercise daily, perform feet check daily, and have yearly diabetic eye exams.    HTN not ideally controlled. Increase enalapril to 10 mg daily. Pt advised to eat a heart healthy diet and get regular aerobic exercise.    Multifactorial chronic neuropathic pain. Contineu lyrica.  As part of patient's treatment plan I am prescribing a controlled substance.  The patient has been made aware of the appropriate use of such medications, including potential risk of somnolence, limited ability to drive and/or work safely, and potential for dependence and/or overdose.  It has also been made clear that these medications are for use by this patient only, without concomitant use of alcohol or other substances, unless prescribed.  History and physical exam exhibit continued safe and appropriate use of controlled substance.  JESSIE reviewed.  Patient has completed a prescribing agreement detailing terms of continued prescribing of controlled substances, including monitoring JESSIE reports, urine drug screening, and pill counts if necessary.  Patient is aware that inappropriate use will result in cessation of prescribing such medications.    Continue oral vit D    Start B12 injections.    Routine f/u in 3 months, sooner as needed.  Patient was encouraged to keep me informed of any acute changes, lack of improvement, or any new concerning symptoms.  Pt is aware of reasons to seek emergent care.  Patient voiced understanding of all instructions and denied  further questions.    Please note that portions of this note may have been completed with a voice recognition program.

## 2024-02-20 PROBLEM — L40.0 PLAQUE PSORIASIS: Status: ACTIVE | Noted: 2024-02-20

## 2024-02-20 PROBLEM — Z23 NEED FOR HEPATITIS B BOOSTER VACCINATION: Status: ACTIVE | Noted: 2024-02-20

## 2024-02-20 PROBLEM — E53.8 B12 DEFICIENCY: Status: ACTIVE | Noted: 2024-02-20

## 2024-02-20 RX ORDER — ENALAPRIL MALEATE 10 MG/1
10 TABLET ORAL DAILY
Qty: 30 TABLET | Refills: 11 | Status: SHIPPED | OUTPATIENT
Start: 2024-02-20

## 2024-02-27 ENCOUNTER — CLINICAL SUPPORT (OUTPATIENT)
Dept: FAMILY MEDICINE CLINIC | Facility: CLINIC | Age: 44
End: 2024-02-27
Payer: MEDICAID

## 2024-02-27 RX ADMIN — CYANOCOBALAMIN 1000 MCG: 1000 INJECTION, SOLUTION INTRAMUSCULAR; SUBCUTANEOUS at 16:12

## 2024-03-04 ENCOUNTER — TELEMEDICINE (OUTPATIENT)
Dept: PSYCHIATRY | Facility: CLINIC | Age: 44
End: 2024-03-04
Payer: MEDICAID

## 2024-03-04 DIAGNOSIS — F33.1 MODERATE EPISODE OF RECURRENT MAJOR DEPRESSIVE DISORDER: ICD-10-CM

## 2024-03-04 DIAGNOSIS — F41.1 GENERALIZED ANXIETY DISORDER: ICD-10-CM

## 2024-03-04 DIAGNOSIS — F43.10 POST TRAUMATIC STRESS DISORDER (PTSD): ICD-10-CM

## 2024-03-04 PROCEDURE — 1159F MED LIST DOCD IN RCRD: CPT | Performed by: NURSE PRACTITIONER

## 2024-03-04 PROCEDURE — 99214 OFFICE O/P EST MOD 30 MIN: CPT | Performed by: NURSE PRACTITIONER

## 2024-03-04 PROCEDURE — 1160F RVW MEDS BY RX/DR IN RCRD: CPT | Performed by: NURSE PRACTITIONER

## 2024-03-04 RX ORDER — TRAZODONE HYDROCHLORIDE 50 MG/1
50 TABLET ORAL NIGHTLY
Qty: 30 TABLET | Refills: 1 | Status: SHIPPED | OUTPATIENT
Start: 2024-03-04

## 2024-03-04 RX ORDER — FLUOXETINE HYDROCHLORIDE 40 MG/1
40 CAPSULE ORAL DAILY
Qty: 30 CAPSULE | Refills: 2 | Status: SHIPPED | OUTPATIENT
Start: 2024-03-04

## 2024-03-04 NOTE — PROGRESS NOTES
This provider is located at [facility] ,[address], [address] in [place] [address], using a secure MyChart Video Visit through [employer]. Patient is being seen remotely via telehealth at their home address in [place] [address], and stated they are in a secure environment for this session. The patient's condition being diagnosed/treated is appropriate for telemedicine. The provider identified herself as well as her credentials. The patient, and/or patients guardian, consent to be seen remotely, and when consent is given they understand that the consent allows for patient identifiable information to be sent to a third party as needed.   They may refuse to be seen remotely at any time. The electronic data is encrypted and password protected, and the patient and/or guardian has been advised of the potential risks to privacy not withstanding such measures.     You have chosen to receive care through a telehealth visit.  Do you consent to use a video/audio connection for your medical care today? Yes    Subjective   Clementine Johnson is a 44 y.o. female who presents today for follow up    Chief Complaint: Depression and anxiety    History of Present Illness:   History of Present Illness  Clementine Johnson presents today via MyChart radio visit for medication management follow-up.  Reports increased stressors that she continues to struggle with chronic pain that is negatively impacting her overall mood.  Currently followed by neurology and receiving Botox injections (with next appointment being Monday) for cervical dystonia.  Says that last injections provided relief of pain for about 1 week.  She also got denied disability benefits, but has reopened case and will have assessment by disability provider on 3/14/2024.  This has caused increased stress, anxiety and depression due to the financial strain of having no income.  Has fallen behind on several bills including her  mortgage and is [place] [facility].  Reports feeling down, sad, no motivation and decreased interest in activities.  Also feeling anxious, nervous and on edge.  Rates overall depression 9/10, rates anxiety 7-8/10 on a 0-10 scale with 10 being the worst.  Sleep varies, has trouble falling asleep and wakes up frequently during the night.  Typically obtains only about 2 hours of sleep at a time during the night.  Denies any issues with appetite. Currently taking Prozac 20 mg daily, prazosin 1 mg nightly and buspirone 15 mg 4 times daily.  Adamantly denies SI/HI.     Previous Psych Meds: Zoloft, Paxil, Xanax, hydroxyzine.  Currently taking Prozac (x 5 years), buspirone (x 5 years) and prazosin     The following portions of the patient's history were reviewed and updated as appropriate: allergies, current medications, past family history, past medical history, past social history, past surgical history and problem list.      Past Medical History:  Past Medical History:   Diagnosis Date    Anxiety     Arthritis     DDD (degenerative disc disease), cervical     DDD (degenerative disc disease), lumbar     Depression     Diabetes mellitus     Ear piercing     Gestational diabetes     HPV (human papilloma virus) infection     Lower back pain     PMS (premenstrual syndrome)     Pregnancy     A0 s/p CS x 1    Seasonal allergies     Substance abuse     Toxemia in pregnancy     Trauma     Varicella     Wears contact lenses     Wears glasses        Social History:  Social History     Socioeconomic History    Marital status: Legally    Tobacco Use    Smoking status: Every Day     Current packs/day: 1.00     Average packs/day: 1 pack/day for 29.2 years (29.2 ttl pk-yrs)     Types: Cigarettes     Start date: 1995     Passive exposure: Current    Smokeless tobacco: Never   Vaping Use    Vaping status: Never Used   Substance and Sexual Activity    Alcohol use: No    Drug use: Not Currently      "Types: Benzodiazepines, Hydrocodone, Marijuana, Oxycodone     Comment: last use for any pills has been \"years\".  Last marijuana use was 5-6 months ago (assessed 10/17/22)    Sexual activity: Defer       Family History:  Family History   Problem Relation Age of Onset    Arthritis Mother     Arthritis Father     Arthritis Maternal Aunt     Stroke Paternal Uncle     Arthritis Paternal Grandmother     Stroke Maternal Grandmother        Past Surgical History:  Past Surgical History:   Procedure Laterality Date     SECTION      x1    COLPOSCOPY N/A 10/31/2022    Procedure: COLPOSCOPY OF THE VULVA, WIDE LOCAL EXCISION OF VULVAR LESIONS TIMES FIVE;  Surgeon: Nelson Copeland MD;  Location: [facility];  Service: Obstetrics/Gynecology;  Laterality: N/A;    TUBAL COAGULATION LAPAROSCOPIC N/A 2023    Procedure: LAPAROSCOPIC TUBAL LIGATION, DRAINAGE OF LEFT OVARIAN CYST, HYSTEROSCOPY, DILATION AND CURETTAGE, ENDOMETRIAL ABLATION WITH NOVASURE;  Surgeon: Nelson Copeland MD;  Location: [facility] OR;  Service: Obstetrics/Gynecology;  Laterality: N/A;    VULVA BIOPSY N/A 3/3/2023    Procedure: Colposcopy of vulva, Wide local excision of vulvar lesion;  Surgeon: Nelson Copeland MD;  Location: [facility] OR;  Service: Obstetrics/Gynecology;  Laterality: N/A;    WISDOM TOOTH EXTRACTION         Problem List:  Patient Active Problem List   Diagnosis    Depression with anxiety    Meralgia paresthetica of left side    Vitamin D deficiency    Type 2 diabetes mellitus with hyperglycemia, without long-term current use of insulin    Chronic neck pain    Cervical radiculopathy    Seasonal allergic rhinitis due to pollen    Bronchospasm    Essential hypertension    Situational anxiety    DDD (degenerative disc disease), cervical    DDD (degenerative disc disease), lumbar    Bilateral carpal tunnel syndrome    Chronic contact dermatitis    Abnormal uterine bleeding (AUB)    Genital warts    Vulvar intraepithelial neoplasia " (FAN) grade 3    Morbid obesity    Posttraumatic torticollis    Dependent edema    S/P tubal ligation    S/P endometrial ablation    Chronic pain syndrome    Plaque psoriasis    B12 deficiency    Need for hepatitis B booster vaccination       Allergy:   Allergies   Allergen Reactions    Nsaids Rash and Swelling     Swelling in the legs/feet    Codeine Rash    Erythromycin Rash    Erythromycin Base Rash    Tramadol Rash        Current Medications:   Current Outpatient Medications   Medication Sig Dispense Refill    FLUoxetine (PROzac) 40 MG capsule Take 1 capsule by mouth Daily. 30 capsule 2    B-D UF III MINI PEN NEEDLES 31G X 5 MM misc FOR USE WITH INSULIN PENS. FOLLOW INSTRUCTIONS ON INSULIN PENS. 100 each 4    baclofen (LIORESAL) 10 MG tablet TAKE 1-2 BY MOUTH THREE TIMES DAILY AS NEEDED FOR MUSCLE SPASM 90 tablet 1    buprenorphine-naloxone (SUBOXONE) 8-2 MG per SL tablet PLACE 2 TABLET UNDER TONGUE ONCE A DAY      busPIRone (BUSPAR) 15 MG tablet Take 1 tablet by mouth 4 (Four) Times a Day. 120 tablet 2    enalapril (VASOTEC) 10 MG tablet Take 1 tablet by mouth Daily. 30 tablet 11    ergocalciferol (ERGOCALCIFEROL) 1.25 MG (22603 UT) capsule Take 1 capsule by mouth Every 7 (Seven) Days. 4 capsule 2    ferrous gluconate (FERGON) 324 MG tablet Take 1 tablet by mouth 2 (Two) Times a Day With Meals. Or take with vitamin C. 60 tablet 0    fluconazole (Diflucan) 150 MG tablet Take one tablet today, repeat in 3 days 2 tablet 0    glipizide (GLUCOTROL) 5 MG tablet Take 1 tablet by mouth 2 (Two) Times a Day Before Meals. 60 tablet 11    glucose blood (ONE TOUCH ULTRA TEST) test strip Check blood sugar twice a day. 100 each 2    glucose monitor monitoring kit 1 each 2 (Two) Times a Day 1 each 0    hydrOXYzine (ATARAX) 25 MG tablet Take 1 tablet by mouth Every 6 (Six) Hours As Needed.  0    metFORMIN ER (GLUCOPHAGE-XR) 500 MG 24 hr tablet Take 2 tablets by mouth Daily With Breakfast. 60 tablet 11    ONETOUCH DELICA LANCETS  33G misc Inject 1 each under the skin into the appropriate area as directed 2 (Two) Times a Day. 100 each 11    prazosin (MINIPRESS) 1 MG capsule Take 1 capsule by mouth Every Night. 30 capsule 2    pregabalin (Lyrica) 200 MG capsule Take 1 capsule by mouth 3 (Three) Times a Day. 90 capsule 2    SITagliptin (Januvia) 100 MG tablet Take 1 tablet by mouth Daily. 30 tablet 11     Current Facility-Administered Medications   Medication Dose Route Frequency Provider Last Rate Last Admin    cyanocobalamin injection 1,000 mcg  1,000 mcg Intramuscular Q7 Days Carrie Ocampo MD   1,000 mcg at 02/27/24 1612     Review of Systems   Constitutional:  Negative for activity change, appetite change, unexpected weight gain and unexpected weight loss.   Respiratory:  Negative for shortness of breath.    Cardiovascular:  Negative for chest pain.   Musculoskeletal:  Positive for neck pain and neck stiffness.   Psychiatric/Behavioral:  Positive for sleep disturbance, depressed mood and stress. Negative for suicidal ideas. The patient is nervous/anxious.      Physical Exam  Constitutional:       General: She is not in acute distress.     Appearance: Normal appearance.   Neurological:      Mental Status: She is alert.     Vitals:   The patient was seen remotely today via a [facility] Video Visit through [facility].  Unable to obtain vital signs due to nature of remote visit.    Mental Status Exam:   Hygiene:    Appears good  Cooperation:  Cooperative  Eye Contact:   ANA r/t video visit  Psychomotor Behavior:  Appropriate  Affect:  Full range and Appropriate  Mood: sad, depressed, and anxious  Hopelessness: Denies  Speech:  Normal  Thought Process:  Goal directed and Linear  Thought Content:  Mood congruent  Suicidal:  None  Homicidal:  None  Hallucinations:  None  Delusion:  None  Memory:  Intact  Orientation:  Person, Place, Time, and Situation  Reliability:  good  Insight:  Good  Judgement:  Good  Impulse Control:  Good    Assessment & Plan    Problems Addressed this Visit    None  Visit Diagnoses       Generalized anxiety disorder        Relevant Medications    FLUoxetine (PROzac) 40 MG capsule    Post traumatic stress disorder (PTSD)        Relevant Medications    FLUoxetine (PROzac) 40 MG capsule    Moderate episode of recurrent major depressive disorder        Relevant Medications    FLUoxetine (PROzac) 40 MG capsule          Diagnoses         Codes Comments    Generalized anxiety disorder     ICD-10-CM: F41.1  ICD-9-CM: 300.02     Post traumatic stress disorder (PTSD)     ICD-10-CM: F43.10  ICD-9-CM: 309.81     Moderate episode of recurrent major depressive disorder     ICD-10-CM: F33.1  ICD-9-CM: 296.32             Visit Diagnoses:    ICD-10-CM ICD-9-CM   1. Generalized anxiety disorder  F41.1 300.02   2. Post traumatic stress disorder (PTSD)  F43.10 309.81   3. Moderate episode of recurrent major depressive disorder  F33.1 296.32     Clementine presents today via [employer] video visit for medication management follow-up.  Struggling with increased anxiety and depression, likely due to situational stressors.  Has been struggling financially and continues to struggle with chronic pain that have contributed to increased anxiety and depression.  Will increase Prozac from 20 mg to 40 mg daily to help with better control of anxiety and depression, will start trazodone 50 mg nightly to help with sleep/depression.  Will continue with buspirone 15 mg 4 times daily and prazosin 1 mg nightly.  Denies any adverse effects of current medication regimen.    Monitor for signs and symptoms of serotonin syndrome/serotonin toxicity (eg, hyperreflexia, clonus, hyperthermia, diaphoresis, tremor, autonomic instability, mental status changes).     -Start trazodone 50 mg nightly for sleep/depression  -Increase Prozac from 20 mg to 40 mg daily for anxiety and depression  -Refill prazosin 1 mg at night for nightmares/sleep  -Refill buspirone 15 mg 4 times daily for  anxiety    -Reviewed previous available documentation and most recent available labs. JESSIE reviewed and is appropriate.      GOALS:  Short Term Goals: Patient will be compliant with medication, and patient will have no significant medication related side effects.  Patient will be engaged in psychotherapy as indicated.  Patient will report subjective improvement of symptoms.  Long term goals: To stabilize mood and treat/improve subjective symptoms, the patient will stay out of the hospital, the patient will be at an optimal level of functioning, and the patient will take all medications as prescribed.  The patient/guardian verbalized understanding and agreement with goals that were mutually set.    TREATMENT PLAN: Continue supportive psychotherapy efforts and medications as indicated for patient's diagnosis.  Pharmacological and Non-Pharmacological treatment options discussed during today's visit. Patient/Guardian acknowledged and verbally consented with current treatment plan and was educated on the importance of compliance with treatment and follow-up appointments.      MEDICATION ISSUES:  Discussed medication options and treatment plan of prescribed medication as well as the risks, benefits, any black box warnings, and side effects including potential falls, possible impaired driving, and metabolic adversities among others. Patient is agreeable to call the office with any worsening of symptoms or onset of side effects, or if any concerns or questions arise.  The contact information for the office is made available to the patient. Patient is agreeable to call 911 or go to the nearest ER should they begin having any SI/HI, or if any urgent concerns arise. No medication side effects or related complaints today.     MEDS ORDERED DURING VISIT:  New Medications Ordered This Visit   Medications    FLUoxetine (PROzac) 40 MG capsule     Sig: Take 1 capsule by mouth Daily.     Dispense:  30 capsule     Refill:  2        FOLLOW UP:  Return in about 8 weeks (around 4/29/2024) for Recheck, Video visit.             This document has been electronically signed by SHANELLE Galvez  March 4, 2024 10:09 EST    Please note that portions of this note were completed with a voice recognition program. Efforts were made to edit dictation, but occasionally words are mistranscribed.

## 2024-03-05 ENCOUNTER — CLINICAL SUPPORT (OUTPATIENT)
Dept: FAMILY MEDICINE CLINIC | Facility: CLINIC | Age: 44
End: 2024-03-05
Payer: MEDICAID

## 2024-03-05 DIAGNOSIS — E53.8 B12 DEFICIENCY: Primary | ICD-10-CM

## 2024-03-05 DIAGNOSIS — E55.9 VITAMIN D DEFICIENCY: ICD-10-CM

## 2024-03-05 PROCEDURE — 96372 THER/PROPH/DIAG INJ SC/IM: CPT | Performed by: FAMILY MEDICINE

## 2024-03-05 RX ADMIN — CYANOCOBALAMIN 1000 MCG: 1000 INJECTION, SOLUTION INTRAMUSCULAR; SUBCUTANEOUS at 16:07

## 2024-03-07 ENCOUNTER — OFFICE VISIT (OUTPATIENT)
Dept: OBSTETRICS AND GYNECOLOGY | Facility: CLINIC | Age: 44
End: 2024-03-07
Payer: MEDICAID

## 2024-03-07 VITALS
HEIGHT: 62 IN | DIASTOLIC BLOOD PRESSURE: 84 MMHG | SYSTOLIC BLOOD PRESSURE: 132 MMHG | WEIGHT: 226 LBS | BODY MASS INDEX: 41.59 KG/M2

## 2024-03-07 DIAGNOSIS — N93.9 ABNORMAL UTERINE BLEEDING (AUB): ICD-10-CM

## 2024-03-07 DIAGNOSIS — E61.1 IRON DEFICIENCY: ICD-10-CM

## 2024-03-07 DIAGNOSIS — D07.1 VULVAR INTRAEPITHELIAL NEOPLASIA (VIN) GRADE 3: ICD-10-CM

## 2024-03-07 DIAGNOSIS — N83.201 RIGHT OVARIAN CYST: Primary | ICD-10-CM

## 2024-03-07 DIAGNOSIS — Z98.51 S/P TUBAL LIGATION: ICD-10-CM

## 2024-03-07 DIAGNOSIS — Z98.890 S/P ENDOMETRIAL ABLATION: ICD-10-CM

## 2024-03-07 DIAGNOSIS — R10.2 PELVIC PAIN: ICD-10-CM

## 2024-03-07 PROCEDURE — 3075F SYST BP GE 130 - 139MM HG: CPT | Performed by: OBSTETRICS & GYNECOLOGY

## 2024-03-07 PROCEDURE — 3079F DIAST BP 80-89 MM HG: CPT | Performed by: OBSTETRICS & GYNECOLOGY

## 2024-03-07 PROCEDURE — 99213 OFFICE O/P EST LOW 20 MIN: CPT | Performed by: OBSTETRICS & GYNECOLOGY

## 2024-03-07 RX ORDER — ERGOCALCIFEROL 1.25 MG/1
1 CAPSULE ORAL
Qty: 4 CAPSULE | Refills: 2 | Status: SHIPPED | OUTPATIENT
Start: 2024-03-07

## 2024-03-08 LAB — CANCER AG125 SERPL-ACNC: 11.7 U/ML (ref 0–38.1)

## 2024-03-08 RX ORDER — FERROUS GLUCONATE 324(38)MG
324 TABLET ORAL 2 TIMES DAILY WITH MEALS
Qty: 60 TABLET | Refills: 0 | Status: SHIPPED | OUTPATIENT
Start: 2024-03-08

## 2024-03-12 ENCOUNTER — CLINICAL SUPPORT (OUTPATIENT)
Dept: FAMILY MEDICINE CLINIC | Facility: CLINIC | Age: 44
End: 2024-03-12
Payer: MEDICAID

## 2024-03-12 DIAGNOSIS — E53.8 VITAMIN B12 DEFICIENCY: Primary | ICD-10-CM

## 2024-03-12 RX ADMIN — CYANOCOBALAMIN 1000 MCG: 1000 INJECTION, SOLUTION INTRAMUSCULAR; SUBCUTANEOUS at 11:37

## 2024-03-15 DIAGNOSIS — F51.5 NIGHTMARES: ICD-10-CM

## 2024-03-15 RX ORDER — PRAZOSIN HYDROCHLORIDE 1 MG/1
1 CAPSULE ORAL NIGHTLY
Qty: 30 CAPSULE | Refills: 2 | Status: SHIPPED | OUTPATIENT
Start: 2024-03-15

## 2024-03-15 NOTE — TELEPHONE ENCOUNTER
Rx Refill Note  Requested Prescriptions     Pending Prescriptions Disp Refills    prazosin (MINIPRESS) 1 MG capsule 30 capsule 2     Sig: Take 1 capsule by mouth Every Night.      Last office visit with prescribing clinician: 10/27/2022   Last telemedicine visit with prescribing clinician: 3/4/2024   Next office visit with prescribing clinician: 4/29/2024                         Would you like a call back once the refill request has been completed: [] Yes [] No    If the office needs to give you a call back, can they leave a voicemail: [] Yes [] No    Mandy Bradshaw CMA  03/15/24, 10:42 EDT

## 2024-03-18 NOTE — PROGRESS NOTES
GYN Office Visit    Subjective   Chief Complaint   Patient presents with    Pelvic Pain     TVS done today for pelvic pain and spotting.     Clementine Johnson is a 44 y.o. year old  presenting to be seen for follow-up pain and bleeding.    She reports overall improvement with her pain and bleeding since the laparoscopy + NovaSure ablation in  of last year.  She does still have some pain and bleeding.  Ultrasound today.    OB Hx: C/S x 1  Pap smear: ASCUS + HPV, reassuring biopsies , VIN3/CIS removed with wide local excision (NEG margins) 2023    Past Medical History:   Diagnosis Date    Anxiety     Arthritis     DDD (degenerative disc disease), cervical     DDD (degenerative disc disease), lumbar     Depression     Diabetes mellitus     Ear piercing     Gestational diabetes     HPV (human papilloma virus) infection     Lower back pain     PMS (premenstrual syndrome)     Pregnancy     A0 s/p CS x 1    Seasonal allergies     Substance abuse     Toxemia in pregnancy     Trauma     Varicella     Wears contact lenses     Wears glasses        Past Surgical History:   Procedure Laterality Date     SECTION      x1    COLPOSCOPY N/A 10/31/2022    Procedure: COLPOSCOPY OF THE VULVA, WIDE LOCAL EXCISION OF VULVAR LESIONS TIMES FIVE;  Surgeon: Nelsno Copeland MD;  Location: [facility];  Service: Obstetrics/Gynecology;  Laterality: N/A;    TUBAL COAGULATION LAPAROSCOPIC N/A 2023    Procedure: LAPAROSCOPIC TUBAL LIGATION, DRAINAGE OF LEFT OVARIAN CYST, HYSTEROSCOPY, DILATION AND CURETTAGE, ENDOMETRIAL ABLATION WITH NOVASURE;  Surgeon: Nelsno Copeland MD;  Location: [facility] OR;  Service: Obstetrics/Gynecology;  Laterality: N/A;    VULVA BIOPSY N/A 3/3/2023    Procedure: Colposcopy of vulva, Wide local excision of vulvar lesion;  Surgeon: Nelson Copeland MD;  Location: [facility] OR;  Service: Obstetrics/Gynecology;  Laterality: N/A;    WISDOM TOOTH EXTRACTION         Family History  "  Problem Relation Age of Onset    Arthritis Mother     Arthritis Father     Arthritis Maternal Aunt     Stroke Paternal Uncle     Arthritis Paternal Grandmother     Stroke Maternal Grandmother         Social History     Tobacco Use    Smoking status: Every Day     Current packs/day: 1.00     Average packs/day: 1 pack/day for 29.2 years (29.2 ttl pk-yrs)     Types: Cigarettes     Start date: 1/1/1995     Passive exposure: Current    Smokeless tobacco: Never   Vaping Use    Vaping status: Never Used   Substance Use Topics    Alcohol use: No    Drug use: Not Currently     Types: Benzodiazepines, Hydrocodone, Marijuana, Oxycodone     Comment: last use for any pills has been \"years\".  Last marijuana use was 5-6 months ago (assessed 10/17/22)       (Not in a hospital admission)      Nsaids, Codeine, Erythromycin, Erythromycin base, and Tramadol    Current Outpatient Medications on File Prior to Visit   Medication Sig Dispense Refill    B-D UF III MINI PEN NEEDLES 31G X 5 MM misc FOR USE WITH INSULIN PENS. FOLLOW INSTRUCTIONS ON INSULIN PENS. 100 each 4    baclofen (LIORESAL) 10 MG tablet TAKE 1-2 BY MOUTH THREE TIMES DAILY AS NEEDED FOR MUSCLE SPASM 90 tablet 1    buprenorphine-naloxone (SUBOXONE) 8-2 MG per SL tablet PLACE 2 TABLET UNDER TONGUE ONCE A DAY      busPIRone (BUSPAR) 15 MG tablet Take 1 tablet by mouth 4 (Four) Times a Day. 120 tablet 2    enalapril (VASOTEC) 10 MG tablet Take 1 tablet by mouth Daily. 30 tablet 11    fluconazole (Diflucan) 150 MG tablet Take one tablet today, repeat in 3 days 2 tablet 0    FLUoxetine (PROzac) 40 MG capsule Take 1 capsule by mouth Daily. 30 capsule 2    glipizide (GLUCOTROL) 5 MG tablet Take 1 tablet by mouth 2 (Two) Times a Day Before Meals. 60 tablet 11    glucose blood (ONE TOUCH ULTRA TEST) test strip Check blood sugar twice a day. 100 each 2    glucose monitor monitoring kit 1 each 2 (Two) Times a Day 1 each 0    hydrOXYzine (ATARAX) 25 MG tablet Take 1 tablet by mouth " "Every 6 (Six) Hours As Needed.  0    metFORMIN ER (GLUCOPHAGE-XR) 500 MG 24 hr tablet Take 2 tablets by mouth Daily With Breakfast. 60 tablet 11    ONETOUCH DELICA LANCETS 33G misc Inject 1 each under the skin into the appropriate area as directed 2 (Two) Times a Day. 100 each 11    pregabalin (Lyrica) 200 MG capsule Take 1 capsule by mouth 3 (Three) Times a Day. 90 capsule 2    SITagliptin (Januvia) 100 MG tablet Take 1 tablet by mouth Daily. 30 tablet 11    traZODone (DESYREL) 50 MG tablet Take 1 tablet by mouth Every Night. 30 tablet 1    ergocalciferol (ERGOCALCIFEROL) 1.25 MG (55040 UT) capsule Take 1 capsule by mouth Every 7 (Seven) Days. 4 capsule 2     Current Facility-Administered Medications on File Prior to Visit   Medication Dose Route Frequency Provider Last Rate Last Admin    cyanocobalamin injection 1,000 mcg  1,000 mcg Intramuscular Q7 Days Carrie Ocampo MD   1,000 mcg at 03/12/24 1137       Social History    Tobacco Use      Smoking status: Every Day        Packs/day: 1.00        Years: 1 pack/day for 29.2 years (29.2 ttl pk-yrs)        Types: Cigarettes        Start date: 1/1/1995        Passive exposure: Current      Smokeless tobacco: Never         Objective   /84   Ht 157.5 cm (62\")   Wt 103 kg (226 lb)   BMI 41.34 kg/m²     Physical Exam:  General Appearance: alert, pleasant, appears stated age, interactive and cooperative  Breasts: Not performed.  Abdomen: no masses, no hepatomegaly, no splenomegaly, soft non-tender, no guarding and no rebound tenderness  Pelvis:  Pelvic: Clinical staff was present for exam  External genitalia:  normal appearance of the external genitalia including Bartholin's and Fieldbrook's glands.  Well-healing surgical site.  No additional dysplasia noted on the vulva.  :  urethral meatus normal;  Vagina:  normal pink mucosa without prolapse or lesions.  Cervix:  normal appearance.   Uterus:  normal size, shape and consistency.  Adnexa:  normal bimanual exam of " the adnexa.  Rectal:  digital rectal exam not performed; anus visually normal appearing.         Medical Decision Making:    I reviewed her most recent notes and her ultrasound from today.    Ultrasound:  Normal sized, retroverted uterus with no masses. Uterine length measures 6.6 cm. The endometrium measures 4.9 mm. The left ovary is normal in appearance with some free fluid noted in the left adnexa. The right adnexa contains a 5 cm complex, non-vascular mass extending into the posterior cul-de-sac. This is likely ovarian in nature and could reflect a hemorrhagic cyst.     Assessment   Right ovarian cyst, likely hemorrhagic  Chronic pelvic pain + AUB s/p NovaSure ablation  History of tubal ligation  FAN-3 s/p surgical resection with NEG margins 2023  ASCUS + HPV, reassuring biopsies 2022       Plan    Orders Placed This Encounter   Procedures         Order Specific Question:   Release to patient     Answer:   Routine Release [[id number]]       Medication Management: None    Procedures Performed: None    We reviewed her situation in detail today.  Her pain and bleeding symptoms are improved on the whole, however these more recent pain symptoms are likely related to the right ovarian cyst.  We discussed that this is likely functional in nature. A  level was ordered today.  We will allow time for spontaneous resolution.  She will return for a repeat pelvic ultrasound in 6 weeks.  A Pap smear can be repeated at her next visit as well.  All questions answered.    Nelson Copeland MD  Obstetrics and Gynecology  [facility]

## 2024-03-27 ENCOUNTER — CLINICAL SUPPORT (OUTPATIENT)
Dept: FAMILY MEDICINE CLINIC | Facility: CLINIC | Age: 44
End: 2024-03-27
Payer: MEDICAID

## 2024-03-27 PROCEDURE — 96372 THER/PROPH/DIAG INJ SC/IM: CPT | Performed by: FAMILY MEDICINE

## 2024-03-27 RX ADMIN — CYANOCOBALAMIN 1000 MCG: 1000 INJECTION, SOLUTION INTRAMUSCULAR; SUBCUTANEOUS at 11:02

## 2024-04-02 DIAGNOSIS — F41.1 GENERALIZED ANXIETY DISORDER: ICD-10-CM

## 2024-04-03 RX ORDER — BUSPIRONE HYDROCHLORIDE 15 MG/1
15 TABLET ORAL 4 TIMES DAILY
Qty: 120 TABLET | Refills: 2 | Status: SHIPPED | OUTPATIENT
Start: 2024-04-03

## 2024-04-08 DIAGNOSIS — E61.1 IRON DEFICIENCY: ICD-10-CM

## 2024-04-09 ENCOUNTER — CLINICAL SUPPORT (OUTPATIENT)
Dept: FAMILY MEDICINE CLINIC | Facility: CLINIC | Age: 44
End: 2024-04-09
Payer: MEDICAID

## 2024-04-09 DIAGNOSIS — E53.8 VITAMIN B12 DEFICIENCY: Primary | ICD-10-CM

## 2024-04-09 DIAGNOSIS — E53.8 B12 DEFICIENCY: Primary | ICD-10-CM

## 2024-04-09 RX ORDER — FERROUS GLUCONATE 324(38)MG
324 TABLET ORAL 2 TIMES DAILY WITH MEALS
Qty: 60 TABLET | Refills: 1 | Status: SHIPPED | OUTPATIENT
Start: 2024-04-09

## 2024-04-09 RX ORDER — FUROSEMIDE 20 MG/1
TABLET ORAL
Qty: 30 TABLET | Refills: 0 | OUTPATIENT
Start: 2024-04-09

## 2024-04-09 RX ORDER — CYANOCOBALAMIN 1000 UG/ML
1000 INJECTION, SOLUTION INTRAMUSCULAR; SUBCUTANEOUS
Status: SHIPPED | OUTPATIENT
Start: 2024-04-09 | End: 2025-03-11

## 2024-04-09 RX ORDER — FUROSEMIDE 20 MG/1
TABLET ORAL
Qty: 30 TABLET | Refills: 0 | Status: SHIPPED | OUTPATIENT
Start: 2024-04-09

## 2024-04-09 RX ORDER — FERROUS GLUCONATE 324(38)MG
324 TABLET ORAL 2 TIMES DAILY WITH MEALS
Qty: 60 TABLET | Refills: 0 | OUTPATIENT
Start: 2024-04-09

## 2024-04-09 RX ADMIN — CYANOCOBALAMIN 1000 MCG: 1000 INJECTION, SOLUTION INTRAMUSCULAR; SUBCUTANEOUS at 13:06

## 2024-04-16 ENCOUNTER — CLINICAL SUPPORT (OUTPATIENT)
Dept: FAMILY MEDICINE CLINIC | Facility: CLINIC | Age: 44
End: 2024-04-16
Payer: MEDICAID

## 2024-04-16 DIAGNOSIS — E53.8 B12 DEFICIENCY: Primary | ICD-10-CM

## 2024-04-16 PROCEDURE — 96372 THER/PROPH/DIAG INJ SC/IM: CPT | Performed by: FAMILY MEDICINE

## 2024-04-16 RX ADMIN — CYANOCOBALAMIN 1000 MCG: 1000 INJECTION, SOLUTION INTRAMUSCULAR; SUBCUTANEOUS at 16:17

## 2024-04-23 ENCOUNTER — CLINICAL SUPPORT (OUTPATIENT)
Dept: FAMILY MEDICINE CLINIC | Facility: CLINIC | Age: 44
End: 2024-04-23
Payer: MEDICAID

## 2024-04-23 DIAGNOSIS — E53.8 VITAMIN B12 DEFICIENCY: Primary | ICD-10-CM

## 2024-04-23 PROCEDURE — 96372 THER/PROPH/DIAG INJ SC/IM: CPT | Performed by: FAMILY MEDICINE

## 2024-04-23 RX ADMIN — CYANOCOBALAMIN 1000 MCG: 1000 INJECTION, SOLUTION INTRAMUSCULAR; SUBCUTANEOUS at 11:39

## 2024-04-26 ENCOUNTER — OFFICE VISIT (OUTPATIENT)
Dept: OBSTETRICS AND GYNECOLOGY | Facility: CLINIC | Age: 44
End: 2024-04-26
Payer: MEDICAID

## 2024-04-26 VITALS
DIASTOLIC BLOOD PRESSURE: 88 MMHG | BODY MASS INDEX: 40.48 KG/M2 | WEIGHT: 220 LBS | SYSTOLIC BLOOD PRESSURE: 138 MMHG | HEIGHT: 62 IN

## 2024-04-26 DIAGNOSIS — N83.201 CYST OF RIGHT OVARY: Primary | ICD-10-CM

## 2024-04-26 DIAGNOSIS — Z98.51 S/P TUBAL LIGATION: ICD-10-CM

## 2024-04-26 DIAGNOSIS — B97.7 HPV IN FEMALE: ICD-10-CM

## 2024-04-26 DIAGNOSIS — Z98.890 S/P ENDOMETRIAL ABLATION: ICD-10-CM

## 2024-04-26 DIAGNOSIS — Z12.4 SCREENING FOR MALIGNANT NEOPLASM OF CERVIX: ICD-10-CM

## 2024-04-26 PROCEDURE — 99213 OFFICE O/P EST LOW 20 MIN: CPT | Performed by: OBSTETRICS & GYNECOLOGY

## 2024-04-29 ENCOUNTER — TELEMEDICINE (OUTPATIENT)
Dept: PSYCHIATRY | Facility: CLINIC | Age: 44
End: 2024-04-29
Payer: MEDICAID

## 2024-04-29 DIAGNOSIS — F51.5 NIGHTMARES: ICD-10-CM

## 2024-04-29 DIAGNOSIS — F43.10 POST TRAUMATIC STRESS DISORDER (PTSD): ICD-10-CM

## 2024-04-29 DIAGNOSIS — F33.1 MODERATE EPISODE OF RECURRENT MAJOR DEPRESSIVE DISORDER: ICD-10-CM

## 2024-04-29 DIAGNOSIS — F41.1 GENERALIZED ANXIETY DISORDER: Primary | ICD-10-CM

## 2024-04-29 PROCEDURE — 99214 OFFICE O/P EST MOD 30 MIN: CPT | Performed by: NURSE PRACTITIONER

## 2024-04-29 PROCEDURE — 1160F RVW MEDS BY RX/DR IN RCRD: CPT | Performed by: NURSE PRACTITIONER

## 2024-04-29 PROCEDURE — 1159F MED LIST DOCD IN RCRD: CPT | Performed by: NURSE PRACTITIONER

## 2024-04-29 RX ORDER — PROPRANOLOL HYDROCHLORIDE 20 MG/1
10-20 TABLET ORAL DAILY PRN
Qty: 30 TABLET | Refills: 1 | Status: SHIPPED | OUTPATIENT
Start: 2024-04-29

## 2024-04-29 RX ORDER — PRAZOSIN HYDROCHLORIDE 2 MG/1
2 CAPSULE ORAL NIGHTLY
Qty: 30 CAPSULE | Refills: 2 | Status: SHIPPED | OUTPATIENT
Start: 2024-04-29

## 2024-04-29 NOTE — PROGRESS NOTES
This provider is located at [facility] ,[address], [address] in [place] [address], using a secure [employer] Video Visit through [employer] ([employer]). Patient is being seen remotely via telehealth at their home address in [place] [address], and stated they are in a secure environment for this session. The patient's condition being diagnosed/treated is appropriate for telemedicine. The provider identified herself as well as her credentials. The patient, and/or patients guardian, consent to be seen remotely, and when consent is given they understand that the consent allows for patient identifiable information to be sent to a third party as needed.   They may refuse to be seen remotely at any time. The electronic data is encrypted and password protected, and the patient and/or guardian has been advised of the potential risks to privacy not withstanding such measures.     You have chosen to receive care through a telehealth visit.  Do you consent to use a video/audio connection for your medical care today? Yes    Subjective   Clementine Johnson is a 44 y.o. female who presents today for follow up    Chief Complaint: Depression and anxiety    History of Present Illness:   History of Present Illness  Clementine Johnson presents today for medication management follow-up via MyChart video visit.  Has been struggling with increased stressors as she was recently turned down for disability.  Says that her financial issues have caused worsening anxiety and depression that have led to daily panic episodes and increased nightmares.  She has obtained a  and hopes to appeal their decision for disability. Has been fearful of losing her home and becoming homeless as she has no family for support.  Her significant other lives with her and says that he contributes financially, but does not cover the cost of the home expense.  Reports constant anxiety, nervousness, feeling on edge, restlessness  and worry.  Thinking of her situation often results in feeling down, sad and feeling depressed.  Rates overall depression 8/10 on a 0-10 scale with 10 being the worst.  Anxiety is constant, but varies in severity.  Trazodone has been helpful with falling asleep at night, but says that she had to decrease dose by half as she was feeling more sedated the following day with the full dose of 50 mg.  Has noticed increase in nightmares that contribute to anxiety and depression. Obtained Botox injections around March that was beneficial for pain management, but says that muscles are starting to spasm again. This causes difficulty with comfort at night that also negatively impacts sleep. Continues to take prazosin nightly and feels that medication had been beneficial until a couple weeks ago.  Also taking Prozac 40 mg daily, buspirone 15 mg 4 times daily.  Denies any current SI/HI.  PHQ-9 total Score: 6, DARRIUS-7 total Score: 13.    Previous Psych Meds: Zoloft, Paxil, Xanax, hydroxyzine.  Currently taking Prozac (x 5 years), buspirone (x 5 years) and prazosin     The following portions of the patient's history were reviewed and updated as appropriate: allergies, current medications, past family history, past medical history, past social history, past surgical history and problem list.      Past Medical History:  Past Medical History:   Diagnosis Date    Anxiety     Arthritis     DDD (degenerative disc disease), cervical     DDD (degenerative disc disease), lumbar     Depression     Diabetes mellitus     Ear piercing     Gestational diabetes     HPV (human papilloma virus) infection     Lower back pain     PMS (premenstrual syndrome)     Pregnancy     A0 s/p CS x 1    Seasonal allergies     Substance abuse     Toxemia in pregnancy     Trauma     Varicella     Wears contact lenses     Wears glasses        Social History:  Social History     Socioeconomic History    Marital status:    Tobacco Use    Smoking status:  "Every Day     Current packs/day: 1.00     Average packs/day: 1 pack/day for 29.3 years (29.3 ttl pk-yrs)     Types: Cigarettes     Start date: 1995     Passive exposure: Current    Smokeless tobacco: Never   Vaping Use    Vaping status: Never Used   Substance and Sexual Activity    Alcohol use: No    Drug use: Not Currently     Types: Benzodiazepines, Hydrocodone, Marijuana, Oxycodone     Comment: last use for any pills has been \"years\".  Last marijuana use was 5-6 months ago (assessed 10/17/22)    Sexual activity: Defer       Family History:  Family History   Problem Relation Age of Onset    Arthritis Mother     Arthritis Father     Arthritis Maternal Aunt     Stroke Paternal Uncle     Arthritis Paternal Grandmother     Stroke Maternal Grandmother        Past Surgical History:  Past Surgical History:   Procedure Laterality Date     SECTION      x1    COLPOSCOPY N/A 10/31/2022    Procedure: COLPOSCOPY OF THE VULVA, WIDE LOCAL EXCISION OF VULVAR LESIONS TIMES FIVE;  Surgeon: Nelson Copeland MD;  Location: [facility];  Service: Obstetrics/Gynecology;  Laterality: N/A;    TUBAL COAGULATION LAPAROSCOPIC N/A 2023    Procedure: LAPAROSCOPIC TUBAL LIGATION, DRAINAGE OF LEFT OVARIAN CYST, HYSTEROSCOPY, DILATION AND CURETTAGE, ENDOMETRIAL ABLATION WITH NOVASURE;  Surgeon: Nelson Copeland MD;  Location: [facility];  Service: Obstetrics/Gynecology;  Laterality: N/A;    VULVA BIOPSY N/A 3/3/2023    Procedure: Colposcopy of vulva, Wide local excision of vulvar lesion;  Surgeon: Nelson Copeland MD;  Location: [facility];  Service: Obstetrics/Gynecology;  Laterality: N/A;    WISDOM TOOTH EXTRACTION         Problem List:  Patient Active Problem List   Diagnosis    Depression with anxiety    Meralgia paresthetica of left side    Vitamin D deficiency    Type 2 diabetes mellitus with hyperglycemia, without long-term current use of insulin    Chronic neck pain    Cervical radiculopathy    Seasonal allergic " rhinitis due to pollen    Bronchospasm    Essential hypertension    Situational anxiety    DDD (degenerative disc disease), cervical    DDD (degenerative disc disease), lumbar    Bilateral carpal tunnel syndrome    Chronic contact dermatitis    Abnormal uterine bleeding (AUB)    Genital warts    Vulvar intraepithelial neoplasia (FAN) grade 3    Morbid obesity    Posttraumatic torticollis    Dependent edema    S/P tubal ligation    S/P endometrial ablation with Novasure    Chronic pain syndrome    Plaque psoriasis    B12 deficiency    Need for hepatitis B booster vaccination       Allergy:   Allergies   Allergen Reactions    Nsaids Rash and Swelling     Swelling in the legs/feet    Codeine Rash    Erythromycin Rash    Erythromycin Base Rash    Tramadol Rash        Current Medications:   Current Outpatient Medications   Medication Sig Dispense Refill    prazosin (MINIPRESS) 2 MG capsule Take 1 capsule by mouth Every Night. 30 capsule 2    traZODone (DESYREL) 50 MG tablet Take 1 tablet by mouth Every Night. (Patient taking differently: Take 0.5 tablets by mouth Every Night.) 30 tablet 1    B-D UF III MINI PEN NEEDLES 31G X 5 MM misc FOR USE WITH INSULIN PENS. FOLLOW INSTRUCTIONS ON INSULIN PENS. 100 each 4    baclofen (LIORESAL) 10 MG tablet TAKE 1-2 BY MOUTH THREE TIMES DAILY AS NEEDED FOR MUSCLE SPASM 90 tablet 1    buprenorphine-naloxone (SUBOXONE) 8-2 MG per SL tablet PLACE 2 TABLET UNDER TONGUE ONCE A DAY      busPIRone (BUSPAR) 15 MG tablet Take 1 tablet by mouth 4 (Four) Times a Day. 120 tablet 2    enalapril (VASOTEC) 10 MG tablet Take 1 tablet by mouth Daily. 30 tablet 11    ergocalciferol (ERGOCALCIFEROL) 1.25 MG (11358 UT) capsule Take 1 capsule by mouth Every 7 (Seven) Days. 4 capsule 2    ferrous gluconate (FERGON) 324 MG tablet Take 1 tablet by mouth 2 (Two) Times a Day With Meals. Or take with vitamin C. 60 tablet 1    fluconazole (Diflucan) 150 MG tablet Take one tablet today, repeat in 3 days 2 tablet  0    FLUoxetine (PROzac) 40 MG capsule Take 1 capsule by mouth Daily. 30 capsule 2    furosemide (Lasix) 20 MG tablet 1 po daily as needed for swelling 30 tablet 0    glipizide (GLUCOTROL) 5 MG tablet Take 1 tablet by mouth 2 (Two) Times a Day Before Meals. 60 tablet 11    glucose blood (ONE TOUCH ULTRA TEST) test strip Check blood sugar twice a day. 100 each 2    glucose monitor monitoring kit 1 each 2 (Two) Times a Day 1 each 0    hydrOXYzine (ATARAX) 25 MG tablet Take 1 tablet by mouth Every 6 (Six) Hours As Needed.  0    metFORMIN ER (GLUCOPHAGE-XR) 500 MG 24 hr tablet Take 2 tablets by mouth Daily With Breakfast. 60 tablet 11    ONETOUCH DELICA LANCETS 33G misc Inject 1 each under the skin into the appropriate area as directed 2 (Two) Times a Day. 100 each 11    pregabalin (Lyrica) 200 MG capsule Take 1 capsule by mouth 3 (Three) Times a Day. 90 capsule 2    propranolol (INDERAL) 20 MG tablet Take 0.5-1 tablets by mouth Daily As Needed (anxiety). 30 tablet 1    SITagliptin (Januvia) 100 MG tablet Take 1 tablet by mouth Daily. 30 tablet 11     Current Facility-Administered Medications   Medication Dose Route Frequency Provider Last Rate Last Admin    cyanocobalamin injection 1,000 mcg  1,000 mcg Intramuscular Q28 Days Carrie Ocampo MD   1,000 mcg at 04/23/24 1139     Review of Systems   Constitutional:  Negative for activity change, appetite change, unexpected weight gain and unexpected weight loss.   Respiratory:  Negative for shortness of breath.    Cardiovascular:  Negative for chest pain.   Musculoskeletal:  Positive for neck pain and neck stiffness.   Psychiatric/Behavioral:  Positive for sleep disturbance, depressed mood and stress. Negative for suicidal ideas. The patient is nervous/anxious.      Physical Exam  Constitutional:       General: She is not in acute distress.     Appearance: Normal appearance.   Neurological:      Mental Status: She is alert.     Vitals:   The patient was seen remotely  today via a [employer] Video Visit through [employer].  Unable to obtain vital signs due to nature of remote visit.    Mental Status Exam:   Hygiene:    Appears good  Cooperation:  Cooperative  Eye Contact:   ANA r/t video visit  Psychomotor Behavior:  Appropriate  Affect:  Full range and Appropriate  Mood: sad, depressed, and anxious  Hopelessness: Denies  Speech:  Normal  Thought Process:  Goal directed and Linear  Thought Content:  Mood congruent  Suicidal:  None  Homicidal:  None  Hallucinations:  None  Delusion:  None  Memory:  Intact  Orientation:  Person, Place, Time, and Situation  Reliability:  good  Insight:  Good  Judgement:  Good  Impulse Control:  Good    Assessment & Plan   Problems Addressed this Visit    None  Visit Diagnoses       Generalized anxiety disorder    -  Primary    Relevant Medications    propranolol (INDERAL) 20 MG tablet    Moderate episode of recurrent major depressive disorder        Post traumatic stress disorder (PTSD)        Nightmares        Relevant Medications    prazosin (MINIPRESS) 2 MG capsule          Diagnoses         Codes Comments    Generalized anxiety disorder    -  Primary ICD-10-CM: F41.1  ICD-9-CM: 300.02     Moderate episode of recurrent major depressive disorder     ICD-10-CM: F33.1  ICD-9-CM: 296.32     Post traumatic stress disorder (PTSD)     ICD-10-CM: F43.10  ICD-9-CM: 309.81     Nightmares     ICD-10-CM: F51.5  ICD-9-CM: 307.47             Visit Diagnoses:    ICD-10-CM ICD-9-CM   1. Generalized anxiety disorder  F41.1 300.02   2. Moderate episode of recurrent major depressive disorder  F33.1 296.32   3. Post traumatic stress disorder (PTSD)  F43.10 309.81   4. Nightmares  F51.5 307.47     Clementine presents today for medication management follow up via [employer] video visit. Reports increased symptoms associated with both anxiety and depression along with more frequent panic attacks and nightmares. Voices that many of her symptoms are likely related to situational  stressors. Sleep has improved with Trazodone, dose decreased to half (25 mg) after feeling sedated following day. Will continue with Trazodone 25 mg daily, Prozac 40 mg daily, Buspirone 15 mg four times daily. Will increase Prazosin from 1 mg to 2 mg nightly and add Propranolol 10-20 mg daily as needed for anxiety. Discussed in detail not taking Propranolol within 4 hours of nighttime dose Prazosin and she voices understanding. Denies any adverse effects of current medication regimen.    -Continue trazodone 25 mg 50 mg nightly for sleep/depression  -Continue Prozac 40 mg daily for anxiety and depression  -Continue buspirone 15 mg 4 times daily for anxiety  -Increase prazosin from 1 mg to 2 mg nightly for nightmares  -Start Propranolol 10 -20 mg daily as needed for anxiety  Instructed not to take Propranolol within 4 hours of Prazosin (taken at night) as this could cause lowered BP/HR    -Reviewed previous available documentation and most recent available labs. JESSIE unable to view per PDMP.       Monitor for signs and symptoms of serotonin syndrome/serotonin toxicity (eg, hyperreflexia, clonus, hyperthermia, diaphoresis, tremor, autonomic instability, mental status changes).   Presents today for medication management follow-up via MyChart video visit.  GOALS:  Short Term Goals: Patient will be compliant with medication, and patient will have no significant medication related side effects.  Patient will be engaged in psychotherapy as indicated.  Patient will report subjective improvement of symptoms.  Long term goals: To stabilize mood and treat/improve subjective symptoms, the patient will stay out of the hospital, the patient will be at an optimal level of functioning, and the patient will take all medications as prescribed.  The patient/guardian verbalized understanding and agreement with goals that were mutually set.    TREATMENT PLAN: Continue supportive psychotherapy efforts and medications as indicated for  patient's diagnosis.  Pharmacological and Non-Pharmacological treatment options discussed during today's visit. Patient/Guardian acknowledged and verbally consented with current treatment plan and was educated on the importance of compliance with treatment and follow-up appointments.      MEDICATION ISSUES:  Discussed medication options and treatment plan of prescribed medication as well as the risks, benefits, any black box warnings, and side effects including potential falls, possible impaired driving, and metabolic adversities among others. Patient is agreeable to call the office with any worsening of symptoms or onset of side effects, or if any concerns or questions arise.  The contact information for the office is made available to the patient. Patient is agreeable to call 911 or go to the nearest ER should they begin having any SI/HI, or if any urgent concerns arise. No medication side effects or related complaints today.     MEDS ORDERED DURING VISIT:  New Medications Ordered This Visit   Medications    propranolol (INDERAL) 20 MG tablet     Sig: Take 0.5-1 tablets by mouth Daily As Needed (anxiety).     Dispense:  30 tablet     Refill:  1    prazosin (MINIPRESS) 2 MG capsule     Sig: Take 1 capsule by mouth Every Night.     Dispense:  30 capsule     Refill:  2       FOLLOW UP:  Return in about 8 weeks (around 6/24/2024).             This document has been electronically signed by SHANELLE Galvez  April 29, 2024 12:20 EDT    Please note that portions of this note were completed with a voice recognition program. Efforts were made to edit dictation, but occasionally words are mistranscribed.

## 2024-04-30 ENCOUNTER — CLINICAL SUPPORT (OUTPATIENT)
Dept: FAMILY MEDICINE CLINIC | Facility: CLINIC | Age: 44
End: 2024-04-30
Payer: MEDICAID

## 2024-04-30 DIAGNOSIS — E53.8 B12 DEFICIENCY: Primary | ICD-10-CM

## 2024-05-02 DIAGNOSIS — F33.1 MODERATE EPISODE OF RECURRENT MAJOR DEPRESSIVE DISORDER: ICD-10-CM

## 2024-05-02 LAB — REF LAB TEST METHOD: NORMAL

## 2024-05-06 RX ORDER — TRAZODONE HYDROCHLORIDE 50 MG/1
25 TABLET ORAL NIGHTLY
Qty: 15 TABLET | Refills: 1 | Status: SHIPPED | OUTPATIENT
Start: 2024-05-06

## 2024-05-07 RX ORDER — FUROSEMIDE 20 MG/1
TABLET ORAL
Qty: 30 TABLET | Refills: 0 | Status: SHIPPED | OUTPATIENT
Start: 2024-05-07

## 2024-05-08 ENCOUNTER — CLINICAL SUPPORT (OUTPATIENT)
Dept: FAMILY MEDICINE CLINIC | Facility: CLINIC | Age: 44
End: 2024-05-08
Payer: MEDICAID

## 2024-05-08 DIAGNOSIS — E53.8 B12 DEFICIENCY: Primary | ICD-10-CM

## 2024-05-08 PROCEDURE — 96372 THER/PROPH/DIAG INJ SC/IM: CPT | Performed by: FAMILY MEDICINE

## 2024-05-08 RX ORDER — FLURBIPROFEN SODIUM 0.3 MG/ML
SOLUTION/ DROPS OPHTHALMIC
Qty: 100 EACH | Refills: 3 | Status: SHIPPED | OUTPATIENT
Start: 2024-05-08

## 2024-05-08 RX ADMIN — CYANOCOBALAMIN 1000 MCG: 1000 INJECTION, SOLUTION INTRAMUSCULAR; SUBCUTANEOUS at 16:00

## 2024-05-15 ENCOUNTER — CLINICAL SUPPORT (OUTPATIENT)
Dept: FAMILY MEDICINE CLINIC | Facility: CLINIC | Age: 44
End: 2024-05-15
Payer: MEDICAID

## 2024-05-15 DIAGNOSIS — E55.9 VITAMIN D DEFICIENCY: ICD-10-CM

## 2024-05-15 DIAGNOSIS — E53.8 B12 DEFICIENCY: Primary | ICD-10-CM

## 2024-05-15 PROCEDURE — 96372 THER/PROPH/DIAG INJ SC/IM: CPT | Performed by: FAMILY MEDICINE

## 2024-05-15 RX ORDER — CYANOCOBALAMIN 1000 UG/ML
1000 INJECTION, SOLUTION INTRAMUSCULAR; SUBCUTANEOUS
Status: SHIPPED | OUTPATIENT
Start: 2024-05-15

## 2024-05-15 RX ADMIN — CYANOCOBALAMIN 1000 MCG: 1000 INJECTION, SOLUTION INTRAMUSCULAR; SUBCUTANEOUS at 16:01

## 2024-05-16 ENCOUNTER — TELEPHONE (OUTPATIENT)
Dept: FAMILY MEDICINE CLINIC | Facility: CLINIC | Age: 44
End: 2024-05-16

## 2024-05-16 NOTE — TELEPHONE ENCOUNTER
Caller: Clementine Johnson    Relationship to patient: Self    Best call back number:     [phone]       Chief complaint: COUGH, FEVER, DIFFICULTY BREATHING     Patient directed to call 911 or go to their nearest emergency room.     Patient verbalized understanding: [x] Yes  [] No  If no, why?

## 2024-05-17 DIAGNOSIS — M54.12 CERVICAL RADICULOPATHY: ICD-10-CM

## 2024-05-17 DIAGNOSIS — G56.03 BILATERAL CARPAL TUNNEL SYNDROME: ICD-10-CM

## 2024-05-17 DIAGNOSIS — M51.36 DDD (DEGENERATIVE DISC DISEASE), LUMBAR: ICD-10-CM

## 2024-05-17 DIAGNOSIS — M50.30 DDD (DEGENERATIVE DISC DISEASE), CERVICAL: ICD-10-CM

## 2024-05-21 ENCOUNTER — OFFICE VISIT (OUTPATIENT)
Dept: FAMILY MEDICINE CLINIC | Facility: CLINIC | Age: 44
End: 2024-05-21
Payer: MEDICAID

## 2024-05-21 VITALS
DIASTOLIC BLOOD PRESSURE: 90 MMHG | BODY MASS INDEX: 41.41 KG/M2 | HEART RATE: 97 BPM | SYSTOLIC BLOOD PRESSURE: 142 MMHG | OXYGEN SATURATION: 74 % | HEIGHT: 62 IN | WEIGHT: 225 LBS

## 2024-05-21 DIAGNOSIS — J18.9 COMMUNITY ACQUIRED PNEUMONIA, BILATERAL: Primary | ICD-10-CM

## 2024-05-21 DIAGNOSIS — R09.02 HYPOXIA: ICD-10-CM

## 2024-05-21 PROCEDURE — 99214 OFFICE O/P EST MOD 30 MIN: CPT | Performed by: FAMILY MEDICINE

## 2024-05-21 PROCEDURE — 3080F DIAST BP >= 90 MM HG: CPT | Performed by: FAMILY MEDICINE

## 2024-05-21 PROCEDURE — 3051F HG A1C>EQUAL 7.0%<8.0%: CPT | Performed by: FAMILY MEDICINE

## 2024-05-21 PROCEDURE — 1160F RVW MEDS BY RX/DR IN RCRD: CPT | Performed by: FAMILY MEDICINE

## 2024-05-21 PROCEDURE — 3077F SYST BP >= 140 MM HG: CPT | Performed by: FAMILY MEDICINE

## 2024-05-21 PROCEDURE — 1125F AMNT PAIN NOTED PAIN PRSNT: CPT | Performed by: FAMILY MEDICINE

## 2024-05-21 PROCEDURE — 1159F MED LIST DOCD IN RCRD: CPT | Performed by: FAMILY MEDICINE

## 2024-05-21 RX ORDER — PREGABALIN 200 MG/1
200 CAPSULE ORAL 3 TIMES DAILY
Qty: 90 CAPSULE | Refills: 0 | Status: SHIPPED | OUTPATIENT
Start: 2024-05-21

## 2024-05-21 RX ORDER — PREDNISONE 20 MG/1
TABLET ORAL
COMMUNITY
Start: 2024-05-16

## 2024-05-21 RX ORDER — ALBUTEROL SULFATE 90 UG/1
2 AEROSOL, METERED RESPIRATORY (INHALATION)
COMMUNITY
Start: 2024-05-16

## 2024-05-21 RX ORDER — FLUCONAZOLE 150 MG/1
TABLET ORAL
Qty: 2 TABLET | Refills: 0 | Status: SHIPPED | OUTPATIENT
Start: 2024-05-21

## 2024-05-21 RX ORDER — DEXTROMETHORPHAN HBR, GUAIFENESIN 20; 200 MG/10ML; MG/10ML
LIQUID ORAL
COMMUNITY
Start: 2024-05-16

## 2024-05-21 RX ORDER — CEFDINIR 300 MG/1
300 CAPSULE ORAL
COMMUNITY
Start: 2024-05-16 | End: 2024-05-23

## 2024-05-21 NOTE — TELEPHONE ENCOUNTER
Rx Refill Note  Requested Prescriptions     Pending Prescriptions Disp Refills    pregabalin (Lyrica) 200 MG capsule 90 capsule 2     Sig: Take 1 capsule by mouth 3 (Three) Times a Day.     Signed Prescriptions Disp Refills    fluconazole (Diflucan) 150 MG tablet 2 tablet 0     Sig: Take one tablet today, repeat in 3 days     Authorizing Provider: ELINA SULLIVAN     Ordering User: LOLA GARCIA      Last office visit with prescribing clinician: 2/19/2024   Last telemedicine visit with prescribing clinician: Visit date not found   Next office visit with prescribing clinician: 5/21/2024                         Would you like a call back once the refill request has been completed: [] Yes [] No    If the office needs to give you a call back, can they leave a voicemail: [] Yes [] No    Lola Garcia MA  05/21/24, 10:35 EDT

## 2024-05-21 NOTE — PROGRESS NOTES
Subjective   Clementine Johnson is a 44 y.o. female.     History of Present Illness  Here for ER f/u. Seen at [facility] on 5/16/2024. Dx'd with bilateral CAP, hypoxia, advised admission but she signed out AMA. Dc'd on omnicef, nebs, etc. Has taken as rx'd. States she has gotten no better. C/o SOA, cough, wheeze, weakness.    Upon triage O2 sat was 74% RA. Pursed lipped breathing, O2 sat increased to 82%. Placed on 3L per NC, O2 sat increased to 94%.    The following portions of the patient's history were reviewed and updated as appropriate: allergies, current medications, past family history, past medical history, past social history, past surgical history, and problem list.    Review of Systems   Constitutional:  Positive for fatigue. Negative for chills, fever and unexpected weight change.   HENT:  Positive for congestion and postnasal drip. Negative for ear pain, mouth sores, nosebleeds, rhinorrhea, sinus pain, sore throat and trouble swallowing.    Eyes:  Negative for pain, discharge and visual disturbance.   Respiratory:  Positive for cough, chest tightness, shortness of breath and wheezing.    Cardiovascular:  Positive for leg swelling (stable). Negative for chest pain and palpitations.   Gastrointestinal:  Positive for diarrhea (mild, chronic, intermittent). Negative for abdominal pain, blood in stool, constipation, nausea and vomiting.   Endocrine: Positive for heat intolerance. Negative for polydipsia and polyuria.   Genitourinary:  Negative for dysuria and hematuria.   Musculoskeletal:  Positive for arthralgias, back pain, myalgias, neck pain and neck stiffness. Negative for joint swelling.   Skin:  Positive for rash. Negative for wound.   Neurological:  Positive for numbness and headaches. Negative for dizziness, tremors, syncope and weakness.   Hematological:  Negative for adenopathy. Does not bruise/bleed easily.   Psychiatric/Behavioral:  Positive for dysphoric mood and sleep disturbance. Negative for  confusion and suicidal ideas. The patient is nervous/anxious.    Pt's previous ROS reviewed and updated as indicated.       Objective   Vitals:    05/21/24 1021   BP: 142/90   Pulse: 97   SpO2: (!) 74%     Body mass index is 41.15 kg/m².      05/21/24  1021   Weight: 102 kg (225 lb)       Physical Exam  Vitals and nursing note reviewed.   Constitutional:       Appearance: She is obese. She is ill-appearing.   HENT:      Head: Atraumatic.      Mouth/Throat:      Mouth: Mucous membranes are moist.   Eyes:      General: No scleral icterus.     Conjunctiva/sclera: Conjunctivae normal.   Cardiovascular:      Rate and Rhythm: Normal rate and regular rhythm.      Pulses: Normal pulses.      Heart sounds: Normal heart sounds.   Pulmonary:      Effort: Pulmonary effort is normal. Respiratory distress (mild) present.      Breath sounds: Decreased breath sounds, wheezing and rales (diffuse) present.   Musculoskeletal:      Right lower leg: Edema (mild) present.      Left lower leg: Edema (mild) present.   Skin:     General: Skin is warm and dry.      Coloration: Skin is not jaundiced or pale.      Findings: Rash (scaling patches on knees, elbows) present.   Neurological:      Mental Status: She is alert and oriented to person, place, and time.   Psychiatric:         Mood and Affect: Mood is anxious. Affect is tearful.         Behavior: Behavior normal. Behavior is cooperative.         Cognition and Memory: Cognition normal.     Pt's previous physical exam reviewed and updated as indicated.    XR chest AP portable  Result Date: 5/16/2024  1. Diffuse bilateral mixed interstitial and airspace infiltrates, consistent with acute pneumonia. Follow-up films recommended. Images reviewed, interpreted, and dictated by Bao Albarran MD     ER labs from [facility] reviewed on chart.  D-dimer 0.67  Troponin 8  CBC with WBC 12.9 and left shift  K 3.1  Ca 8.3  BNP - 543    Lab Results   Component Value Date    HGBA1C 7.1 (A) 02/19/2024    HGBA1C  6.70 (H) 11/15/2023    HGBA1C 7.50 (H) 06/09/2023     Lab Results   Component Value Date    MICROALBUR 28.2 11/15/2023    CREATININE 0.54 (L) 11/15/2023       Assessment & Plan   Diagnoses and all orders for this visit:    1. Community acquired pneumonia, bilateral (Primary)    2. Hypoxia       Advised her to be re-evaluated in ER to which she is amenable. She wishes to go back to [facility] ER. Declines transport via ambulance. Has partner with her today who will take her directly to [facility] ER. Pt taken to vehicle via wheelchair without incident. [facility] ER contacted regarding her current status.    Keep routine f/u as scheduled, f/u sooner as needed.  Patient voiced understanding of all instructions and denied further questions.    Please note that portions of this note may have been completed with a voice recognition program.

## 2024-05-21 NOTE — TELEPHONE ENCOUNTER
JESSIE reviewed. Refill approved. Medication E rx'd to pharmacy as requested. Pt to keep f/u apt as scheduled.

## 2024-05-29 NOTE — PROGRESS NOTES
"Transitional Care Follow Up Visit  Subjective     Clementine Johnson is a 44 y.o. female who presents for a transitional care management visit.     I reviewed and discussed the details of the discharge summary, hospital problems, inpatient lab results, inpatient diagnostic studies, and consultation reports with Clementine.     Current outpatient and discharge medications have been reconciled for the patient.  Reviewed by: Carrie Ocampo MD    History of Present Illness   Course During Hospital Stay:  admitted to [facility] for acute hypoxemic resp failure due to CAP, COPD. Received tx with abx, nebs, CS, oxygen supplementation etc. Dc'd home on continuous oxygen per NC at 2 L.     Admitted 5/21/24, dc'd 5/24/24. Has completed oral abx, CS rx'd at time discharge. Was rx'd \"inhaler\" as well but was not able to fill it at the pharmacy for unclear reasons.    She has maintained smoking cessation since time of D/c. Using nicotine patches.    Reports feeling markedly improved.     Due for B12 shot, next Hep B immunizaiton.    Has not yet had baseline mammogram.     The following portions of the patient's history were reviewed and updated as appropriate: allergies, current medications, past family history, past medical history, past social history, past surgical history, and problem list.    Review of Systems   Constitutional:  Positive for fatigue. Negative for chills, fever and unexpected weight change.   HENT:  Positive for congestion and postnasal drip. Negative for ear pain, mouth sores, nosebleeds, rhinorrhea, sinus pain, sore throat and trouble swallowing.    Eyes:  Negative for pain, discharge and visual disturbance.   Respiratory:  Positive for shortness of breath (mild with exertion) and wheezing (intermittent). Negative for cough and chest tightness.    Cardiovascular:  Positive for leg swelling (stable). Negative for chest pain and palpitations.   Gastrointestinal:  Positive for diarrhea (mild, chronic, intermittent). " "Negative for abdominal pain, blood in stool, constipation, nausea and vomiting.   Endocrine: Positive for heat intolerance. Negative for polydipsia and polyuria.   Genitourinary:  Negative for dysuria and hematuria.   Musculoskeletal:  Positive for arthralgias, back pain, myalgias, neck pain and neck stiffness. Negative for joint swelling.   Skin:  Positive for rash (chronic). Negative for wound.   Neurological:  Positive for numbness and headaches. Negative for dizziness, tremors, syncope and weakness.   Hematological:  Negative for adenopathy. Does not bruise/bleed easily.   Psychiatric/Behavioral:  Positive for dysphoric mood and sleep disturbance. Negative for confusion and suicidal ideas. The patient is nervous/anxious.    Pt's previous ROS reviewed and updated as indicated.       Objective   /80   Pulse 80   Ht 157.5 cm (62\")   Wt 99.2 kg (218 lb 12.8 oz)   SpO2 98%   BMI 40.02 kg/m²     Physical Exam  Vitals and nursing note reviewed.   Constitutional:       Appearance: She is well-groomed. She is obese. She is not ill-appearing.   HENT:      Head: Atraumatic.      Mouth/Throat:      Mouth: Mucous membranes are moist. No oral lesions.      Pharynx: Oropharynx is clear.   Eyes:      General: No scleral icterus.     Conjunctiva/sclera: Conjunctivae normal.   Neck:      Thyroid: No thyroid mass.   Cardiovascular:      Rate and Rhythm: Normal rate and regular rhythm.      Pulses: Normal pulses.      Heart sounds: Normal heart sounds.   Pulmonary:      Effort: Pulmonary effort is normal. No respiratory distress.      Breath sounds: Decreased breath sounds (mildly) present. No wheezing, rhonchi or rales.   Musculoskeletal:      Right lower leg: Edema (mild) present.      Left lower leg: Edema (mild) present.   Lymphadenopathy:      Cervical: No cervical adenopathy.   Skin:     General: Skin is warm and dry.      Coloration: Skin is not cyanotic, jaundiced or pale.      Findings: Rash (scaling patches on " knees, elbows) present.   Neurological:      Mental Status: She is alert and oriented to person, place, and time.   Psychiatric:         Mood and Affect: Mood and affect normal.         Behavior: Behavior normal. Behavior is cooperative.         Cognition and Memory: Cognition normal.     Pt's previous physical exam reviewed and updated as indicated.    XR chest AP portable  Result Date: 5/21/2024  Interval improvement as above. Images reviewed, interpreted, and dictated by Dr. Kat Alaniz. Transcribed by Taz Medina PA-C.    XR chest AP portable  Result Date: 5/16/2024  1. Diffuse bilateral mixed interstitial and airspace infiltrates, consistent with acute pneumonia. Follow-up films recommended. Images reviewed, interpreted, and dictated by Bao Albarran MD       Assessment & Plan   Diagnoses and all orders for this visit:    1. Hospital discharge follow-up (Primary)    2. Pneumonia of both lungs due to infectious organism, unspecified part of lung  -     Overnight Sleep Oximetry Study; Future    3. Chronic obstructive pulmonary disease, unspecified COPD type  -     budesonide-formoterol (Symbicort) 80-4.5 MCG/ACT inhaler; Inhale 2 puffs 2 (Two) Times a Day.  Dispense: 10.2 g; Refill: 11  -     Overnight Sleep Oximetry Study; Future    4. Need for hepatitis B booster vaccination  -     Hepatitis B Vaccine Adult IM (ENERGIX/RECOMBIVAX)    5. B12 deficiency    6. Encounter for screening mammogram for malignant neoplasm of breast  -     Mammo Screening Bilateral With CAD; Future    7. Vitamin D deficiency  -     ergocalciferol (ERGOCALCIFEROL) 1.25 MG (22733 UT) capsule; Take 1 capsule by mouth Every 7 (Seven) Days.  Dispense: 4 capsule; Refill: 2    8. Nocturnal hypoxia  -     Overnight Sleep Oximetry Study; Future      NORMAL 6 MIN WALK IN OFFICE TODAY WITHOUT HYPOXEMIA ON RA.    Continue nocturnal oxygen supplementation if indicated to be necessary by overnight pulse oximetry. Sleep study as  indicated.    Continue monthly vitamin B12 injections.    Routine f/u in 3 months for annual exam, sooner as needed/instructed.  I will contact patient regarding test results and provide instructions regarding any necessary changes in plan of care.  Patient was encouraged to keep me informed of any acute changes, lack of improvement, or any new concerning symptoms.  Pt is aware of reasons to seek emergent care.  Patient voiced understanding of all instructions and denied further questions.      Please note that portions of this note may have been completed with a voice recognition program.

## 2024-05-30 ENCOUNTER — OFFICE VISIT (OUTPATIENT)
Dept: FAMILY MEDICINE CLINIC | Facility: CLINIC | Age: 44
End: 2024-05-30
Payer: MEDICAID

## 2024-05-30 VITALS
DIASTOLIC BLOOD PRESSURE: 80 MMHG | WEIGHT: 218.8 LBS | HEART RATE: 80 BPM | OXYGEN SATURATION: 98 % | BODY MASS INDEX: 40.27 KG/M2 | HEIGHT: 62 IN | SYSTOLIC BLOOD PRESSURE: 126 MMHG

## 2024-05-30 DIAGNOSIS — G47.34 NOCTURNAL HYPOXIA: ICD-10-CM

## 2024-05-30 DIAGNOSIS — Z23 NEED FOR HEPATITIS B BOOSTER VACCINATION: ICD-10-CM

## 2024-05-30 DIAGNOSIS — Z09 HOSPITAL DISCHARGE FOLLOW-UP: Primary | ICD-10-CM

## 2024-05-30 DIAGNOSIS — J18.9 PNEUMONIA OF BOTH LUNGS DUE TO INFECTIOUS ORGANISM, UNSPECIFIED PART OF LUNG: ICD-10-CM

## 2024-05-30 DIAGNOSIS — E53.8 B12 DEFICIENCY: ICD-10-CM

## 2024-05-30 DIAGNOSIS — J44.9 CHRONIC OBSTRUCTIVE PULMONARY DISEASE, UNSPECIFIED COPD TYPE: ICD-10-CM

## 2024-05-30 DIAGNOSIS — Z12.31 ENCOUNTER FOR SCREENING MAMMOGRAM FOR MALIGNANT NEOPLASM OF BREAST: ICD-10-CM

## 2024-05-30 DIAGNOSIS — E55.9 VITAMIN D DEFICIENCY: ICD-10-CM

## 2024-05-30 PROCEDURE — 3074F SYST BP LT 130 MM HG: CPT | Performed by: FAMILY MEDICINE

## 2024-05-30 PROCEDURE — 99214 OFFICE O/P EST MOD 30 MIN: CPT | Performed by: FAMILY MEDICINE

## 2024-05-30 PROCEDURE — 1125F AMNT PAIN NOTED PAIN PRSNT: CPT | Performed by: FAMILY MEDICINE

## 2024-05-30 PROCEDURE — 1159F MED LIST DOCD IN RCRD: CPT | Performed by: FAMILY MEDICINE

## 2024-05-30 PROCEDURE — 90471 IMMUNIZATION ADMIN: CPT | Performed by: FAMILY MEDICINE

## 2024-05-30 PROCEDURE — 3079F DIAST BP 80-89 MM HG: CPT | Performed by: FAMILY MEDICINE

## 2024-05-30 PROCEDURE — 3051F HG A1C>EQUAL 7.0%<8.0%: CPT | Performed by: FAMILY MEDICINE

## 2024-05-30 PROCEDURE — 96372 THER/PROPH/DIAG INJ SC/IM: CPT | Performed by: FAMILY MEDICINE

## 2024-05-30 PROCEDURE — 1160F RVW MEDS BY RX/DR IN RCRD: CPT | Performed by: FAMILY MEDICINE

## 2024-05-30 PROCEDURE — 90746 HEPB VACCINE 3 DOSE ADULT IM: CPT | Performed by: FAMILY MEDICINE

## 2024-05-30 RX ORDER — ERGOCALCIFEROL 1.25 MG/1
1 CAPSULE ORAL
Qty: 4 CAPSULE | Refills: 2 | Status: SHIPPED | OUTPATIENT
Start: 2024-05-30

## 2024-05-30 RX ORDER — BUDESONIDE AND FORMOTEROL FUMARATE DIHYDRATE 80; 4.5 UG/1; UG/1
2 AEROSOL RESPIRATORY (INHALATION)
Qty: 10.2 G | Refills: 11 | Status: SHIPPED | OUTPATIENT
Start: 2024-05-30

## 2024-05-30 RX ADMIN — CYANOCOBALAMIN 1000 MCG: 1000 INJECTION, SOLUTION INTRAMUSCULAR; SUBCUTANEOUS at 10:52

## 2024-06-03 DIAGNOSIS — F41.1 GENERALIZED ANXIETY DISORDER: ICD-10-CM

## 2024-06-03 DIAGNOSIS — F43.10 POST TRAUMATIC STRESS DISORDER (PTSD): ICD-10-CM

## 2024-06-03 DIAGNOSIS — F33.1 MODERATE EPISODE OF RECURRENT MAJOR DEPRESSIVE DISORDER: ICD-10-CM

## 2024-06-03 RX ORDER — FLUOXETINE HYDROCHLORIDE 40 MG/1
40 CAPSULE ORAL DAILY
Qty: 30 CAPSULE | Refills: 1 | Status: SHIPPED | OUTPATIENT
Start: 2024-06-03

## 2024-06-16 DIAGNOSIS — E61.1 IRON DEFICIENCY: ICD-10-CM

## 2024-06-16 DIAGNOSIS — M50.30 DDD (DEGENERATIVE DISC DISEASE), CERVICAL: ICD-10-CM

## 2024-06-16 DIAGNOSIS — M51.36 DDD (DEGENERATIVE DISC DISEASE), LUMBAR: ICD-10-CM

## 2024-06-16 DIAGNOSIS — M54.12 CERVICAL RADICULOPATHY: ICD-10-CM

## 2024-06-16 DIAGNOSIS — G56.03 BILATERAL CARPAL TUNNEL SYNDROME: ICD-10-CM

## 2024-06-17 DIAGNOSIS — E61.1 IRON DEFICIENCY: ICD-10-CM

## 2024-06-17 RX ORDER — FERROUS GLUCONATE 324(38)MG
324 TABLET ORAL 2 TIMES DAILY WITH MEALS
Qty: 60 TABLET | Refills: 1 | Status: SHIPPED | OUTPATIENT
Start: 2024-06-17

## 2024-06-17 RX ORDER — FERROUS GLUCONATE 324(38)MG
324 TABLET ORAL 2 TIMES DAILY WITH MEALS
Qty: 60 TABLET | Refills: 0 | Status: SHIPPED | OUTPATIENT
Start: 2024-06-17

## 2024-06-17 RX ORDER — FLUCONAZOLE 150 MG/1
TABLET ORAL
Qty: 2 TABLET | Refills: 0 | Status: SHIPPED | OUTPATIENT
Start: 2024-06-17

## 2024-06-18 RX ORDER — PREGABALIN 200 MG/1
200 CAPSULE ORAL 3 TIMES DAILY
Qty: 90 CAPSULE | Refills: 0 | Status: SHIPPED | OUTPATIENT
Start: 2024-06-18

## 2024-06-24 ENCOUNTER — TELEMEDICINE (OUTPATIENT)
Dept: PSYCHIATRY | Facility: CLINIC | Age: 44
End: 2024-06-24
Payer: MEDICAID

## 2024-06-24 DIAGNOSIS — F33.1 MODERATE EPISODE OF RECURRENT MAJOR DEPRESSIVE DISORDER: ICD-10-CM

## 2024-06-24 DIAGNOSIS — F41.1 GENERALIZED ANXIETY DISORDER: Primary | ICD-10-CM

## 2024-06-24 DIAGNOSIS — F51.5 NIGHTMARES: ICD-10-CM

## 2024-06-24 DIAGNOSIS — F43.10 POST TRAUMATIC STRESS DISORDER (PTSD): ICD-10-CM

## 2024-06-24 PROCEDURE — 1160F RVW MEDS BY RX/DR IN RCRD: CPT | Performed by: NURSE PRACTITIONER

## 2024-06-24 PROCEDURE — 1159F MED LIST DOCD IN RCRD: CPT | Performed by: NURSE PRACTITIONER

## 2024-06-24 PROCEDURE — 99214 OFFICE O/P EST MOD 30 MIN: CPT | Performed by: NURSE PRACTITIONER

## 2024-06-24 NOTE — PROGRESS NOTES
This provider is located at [facility], Behavioral Health ,[address], [address] in [place] [address], using a secure MyChart Video Visit through [employer]. Patient is being seen remotely via telehealth at their home address in [place] [address], and stated they are in a secure environment for this session. The patient's condition being diagnosed/treated is appropriate for telemedicine. The provider identified herself as well as her credentials. The patient, and/or patients guardian, consent to be seen remotely, and when consent is given they understand that the consent allows for patient identifiable information to be sent to a third party as needed.   They may refuse to be seen remotely at any time. The electronic data is encrypted and password protected, and the patient and/or guardian has been advised of the potential risks to privacy not withstanding such measures.     You have chosen to receive care through a telehealth visit.  Do you consent to use a video/audio connection for your medical care today? Yes    Subjective   Clementine Johnson is a 44 y.o. female who presents today for follow up    Chief Complaint: Depression and anxiety    History of Present Illness:   History of Present Illness  Clementine Johnson presents today via MyChart video visit for medication management follow-up.  Reports increased stressors that she was recently hospitalized with pneumonia.  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  Discussed symptoms with supervisor and was encouraged to go to the ER.  Presented to the ER on 5/16 with acute respiratory failure (O2 Sats around 50% on RA) and diagnosed with pneumonia.  Recommended inpatient stay for treatment, but left AMA as she had vehicle and significant other did not have transportation.  Was given Rocephin, Omnicef and steroids.  Had follow-up with PCP, then sent to the ER again and was admitted 5/21-5/24 for PNA.  Symptoms have improved, no longer feeling SOA, but does report chest congestion. While inpatient, she received an email and was terminated from her job.  Anxiety and depression remain persistent.  She does feel her anxiety has improved with improvement of pain after having Botox injections.  Feeling more down about current situation and health conditions.  Rates overall anxiety 6-7/10, rates depression 6/10 on a 0-10 scale with 10 being the worst.  Has trouble initiating and maintaining sleep at night, continues to have nightmares.  Has upcoming sleep study scheduled in a couple weeks.  Recently had sleep oximetry study that was submitted on Friday, awaiting results.  No longer taking trazodone or propranolol.  Has continued with other daily medications including Prozac, buspirone and prazosin.  Denies SI/HI.  PHQ-9 total Scor: 4, DARRIUS-7 total Scor: 13.    Previous Psych Meds: Zoloft, Paxil, Xanax, hydroxyzine, trazodone, propranolol currently taking Prozac (x 5 years), buspirone (x 5 years) and prazosin     The following portions of the patient's history were reviewed and updated as appropriate: allergies, current medications, past family history, past medical history, past social history, past surgical history and problem list.      Past Medical History:  Past Medical History:   Diagnosis Date    Anxiety     Arthritis     DDD (degenerative disc disease), cervical     DDD (degenerative disc disease), lumbar     Depression     Diabetes mellitus     Ear piercing     Gestational diabetes     HPV (human papilloma virus) infection     Lower back pain     PMS (premenstrual syndrome)     Pregnancy     A0 s/p CS x 1    Seasonal allergies     Substance abuse     Toxemia in pregnancy     Trauma     Varicella     Wears contact lenses     Wears glasses        Social History:  Social History     Socioeconomic History    Marital status:    Tobacco Use    Smoking status: Former     Current packs/day: 1.00     Average  "packs/day: 1 pack/day for 29.5 years (29.5 ttl pk-yrs)     Types: Cigarettes     Start date: 1995     Passive exposure: Current    Smokeless tobacco: Never   Vaping Use    Vaping status: Never Used   Substance and Sexual Activity    Alcohol use: No    Drug use: Not Currently     Types: Benzodiazepines, Hydrocodone, Marijuana, Oxycodone     Comment: last use for any pills has been \"years\".  Last marijuana use was 5-6 months ago (assessed 10/17/22)    Sexual activity: Defer       Family History:  Family History   Problem Relation Age of Onset    Arthritis Mother     Arthritis Father     Arthritis Maternal Aunt     Stroke Paternal Uncle     Arthritis Paternal Grandmother     Stroke Maternal Grandmother        Past Surgical History:  Past Surgical History:   Procedure Laterality Date     SECTION      x1    COLPOSCOPY N/A 10/31/2022    Procedure: COLPOSCOPY OF THE VULVA, WIDE LOCAL EXCISION OF VULVAR LESIONS TIMES FIVE;  Surgeon: Nelson Copeland MD;  Location: [facility];  Service: Obstetrics/Gynecology;  Laterality: N/A;    TUBAL COAGULATION LAPAROSCOPIC N/A 2023    Procedure: LAPAROSCOPIC TUBAL LIGATION, DRAINAGE OF LEFT OVARIAN CYST, HYSTEROSCOPY, DILATION AND CURETTAGE, ENDOMETRIAL ABLATION WITH NOVASURE;  Surgeon: Nelson Copeland MD;  Location: [facility] OR;  Service: Obstetrics/Gynecology;  Laterality: N/A;    VULVA BIOPSY N/A 3/3/2023    Procedure: Colposcopy of vulva, Wide local excision of vulvar lesion;  Surgeon: Nelson Copeland MD;  Location: [facility];  Service: Obstetrics/Gynecology;  Laterality: N/A;    WISDOM TOOTH EXTRACTION         Problem List:  Patient Active Problem List   Diagnosis    Depression with anxiety    Meralgia paresthetica of left side    Vitamin D deficiency    Type 2 diabetes mellitus with hyperglycemia, without long-term current use of insulin    Chronic neck pain    Cervical radiculopathy    Seasonal allergic rhinitis due to pollen    Bronchospasm    " Essential hypertension    Situational anxiety    DDD (degenerative disc disease), cervical    DDD (degenerative disc disease), lumbar    Bilateral carpal tunnel syndrome    Chronic contact dermatitis    Abnormal uterine bleeding (AUB)    Genital warts    Vulvar intraepithelial neoplasia (FAN) grade 3    Morbid obesity    Posttraumatic torticollis    Dependent edema    S/P tubal ligation    S/P endometrial ablation with Novasure    Chronic pain syndrome    Plaque psoriasis    B12 deficiency    Need for hepatitis B booster vaccination    Chronic obstructive pulmonary disease       Allergy:   Allergies   Allergen Reactions    Nsaids Rash and Swelling     Swelling in the legs/feet    Codeine Rash    Erythromycin Rash    Erythromycin Base Rash    Tramadol Rash        Current Medications:   Current Outpatient Medications   Medication Sig Dispense Refill    albuterol sulfate  (90 Base) MCG/ACT inhaler Inhale 2 puffs.      B-D UF III MINI PEN NEEDLES 31G X 5 MM misc FOR USE WITH INSULIN PENS. FOLLOW INSTRUCTIONS ON INSULIN PENS. 100 each 3    budesonide-formoterol (Symbicort) 80-4.5 MCG/ACT inhaler Inhale 2 puffs 2 (Two) Times a Day. 10.2 g 11    buprenorphine-naloxone (SUBOXONE) 8-2 MG per SL tablet PLACE 2 TABLET UNDER TONGUE ONCE A DAY      busPIRone (BUSPAR) 15 MG tablet Take 1 tablet by mouth 4 (Four) Times a Day. 120 tablet 2    enalapril (VASOTEC) 10 MG tablet Take 1 tablet by mouth Daily. 30 tablet 11    ergocalciferol (ERGOCALCIFEROL) 1.25 MG (18591 UT) capsule Take 1 capsule by mouth Every 7 (Seven) Days. 4 capsule 2    ferrous gluconate (FERGON) 324 MG tablet Take 1 tablet by mouth 2 (Two) Times a Day With Meals. Or take with vitamin C. 60 tablet 1    ferrous gluconate (FERGON) 324 MG tablet TAKE 1 TABLET BY MOUTH 2 (TWO) TIMES A DAY WITH MEALS. OR TAKE WITH VITAMIN C. 60 tablet 0    fluconazole (Diflucan) 150 MG tablet Take one tablet today, repeat in 3 days 2 tablet 0    FLUoxetine (PROzac) 40 MG capsule  TAKE 1 CAPSULE BY MOUTH DAILY. 30 capsule 1    furosemide (LASIX) 20 MG tablet TAKE 1 TABLET BY MOUTH DAILY AS NEEDED FOR SWELLING 30 tablet 0    glipizide (GLUCOTROL) 5 MG tablet Take 1 tablet by mouth 2 (Two) Times a Day Before Meals. 60 tablet 11    glucose blood (ONE TOUCH ULTRA TEST) test strip Check blood sugar twice a day. 100 each 2    glucose monitor monitoring kit 1 each 2 (Two) Times a Day 1 each 0    hydrOXYzine (ATARAX) 25 MG tablet Take 1 tablet by mouth Every 6 (Six) Hours As Needed.  0    metFORMIN ER (GLUCOPHAGE-XR) 500 MG 24 hr tablet Take 2 tablets by mouth Daily With Breakfast. 60 tablet 11    ONETOUCH DELICA LANCETS 33G misc Inject 1 each under the skin into the appropriate area as directed 2 (Two) Times a Day. 100 each 11    prazosin (MINIPRESS) 2 MG capsule Take 1 capsule by mouth Every Night. 30 capsule 2    pregabalin (Lyrica) 200 MG capsule Take 1 capsule by mouth 3 (Three) Times a Day. 90 capsule 0    SITagliptin (Januvia) 100 MG tablet Take 1 tablet by mouth Daily. 30 tablet 11     Current Facility-Administered Medications   Medication Dose Route Frequency Provider Last Rate Last Admin    cyanocobalamin injection 1,000 mcg  1,000 mcg Intramuscular Q28 Days Carrie Ocampo MD   1,000 mcg at 05/08/24 1600    cyanocobalamin injection 1,000 mcg  1,000 mcg Intramuscular Q28 Days Carrie Ocampo MD   1,000 mcg at 05/30/24 1052     Review of Systems   Constitutional:  Negative for activity change, appetite change, unexpected weight gain and unexpected weight loss.   Respiratory:  Negative for shortness of breath.    Cardiovascular:  Negative for chest pain.   Musculoskeletal:  Positive for neck pain and neck stiffness.   Psychiatric/Behavioral:  Positive for sleep disturbance, depressed mood and stress. Negative for suicidal ideas. The patient is nervous/anxious.      Physical Exam  Constitutional:       General: She is not in acute distress.     Appearance: Normal appearance.   Neurological:       Mental Status: She is alert.     Vitals:   The patient was seen remotely today via a MyChart Video Visit through [facility].  Unable to obtain vital signs due to nature of remote visit.    Mental Status Exam:   Hygiene:    Appears good  Cooperation:  Cooperative  Eye Contact:   ANA r/t video visit  Psychomotor Behavior:  Appropriate  Affect:  Full range and Appropriate  Mood: depressed and anxious  Hopelessness: Denies  Speech:  Normal  Thought Process:  Goal directed and Linear  Thought Content:  Mood congruent  Suicidal:  None  Homicidal:  None  Hallucinations:  None  Delusion:  None  Memory:  Intact  Orientation:  Person, Place, Time, and Situation  Reliability:  good  Insight:  Good  Judgement:  Good  Impulse Control:  Good    Assessment & Plan   Problems Addressed this Visit    None  Visit Diagnoses       Generalized anxiety disorder    -  Primary    Moderate episode of recurrent major depressive disorder        Post traumatic stress disorder (PTSD)        Nightmares              Diagnoses         Codes Comments    Generalized anxiety disorder    -  Primary ICD-10-CM: F41.1  ICD-9-CM: 300.02     Moderate episode of recurrent major depressive disorder     ICD-10-CM: F33.1  ICD-9-CM: 296.32     Post traumatic stress disorder (PTSD)     ICD-10-CM: F43.10  ICD-9-CM: 309.81     Nightmares     ICD-10-CM: F51.5  ICD-9-CM: 307.47             Visit Diagnoses:    ICD-10-CM ICD-9-CM   1. Generalized anxiety disorder  F41.1 300.02   2. Moderate episode of recurrent major depressive disorder  F33.1 296.32   3. Post traumatic stress disorder (PTSD)  F43.10 309.81   4. Nightmares  F51.5 307.47       Clementine presents today via MyChart video visit for medication management follow-up.  Has had multiple stressful events occur with recent hospitalization for pneumonia and losing her job. The stressors have caused increase in overall anxiety and depression, but feels as though symptoms are adequately controlled with medication. Will  continue with Prozac 40 mg daily, buspirone 15 mg 4 times daily and prazosin 2 mg nightly. Stopped trazodone and propranolol as she is awaiting upcoming sleep study. Recently submitted sleep oximetry study and is awaiting results.    -Continue Prozac 40 mg daily for anxiety and depression  -Continue buspirone 15 mg 4 times daily for anxiety  -Continue prazosin from 1 mg to 2 mg nightly for nightmares    -Reviewed previous available documentation and most recent available labs. JESSIE unable to view per PDMP.       GOALS:  Short Term Goals: Patient will be compliant with medication, and patient will have no significant medication related side effects.  Patient will be engaged in psychotherapy as indicated.  Patient will report subjective improvement of symptoms.  Long term goals: To stabilize mood and treat/improve subjective symptoms, the patient will stay out of the hospital, the patient will be at an optimal level of functioning, and the patient will take all medications as prescribed.  The patient/guardian verbalized understanding and agreement with goals that were mutually set.    TREATMENT PLAN: Continue supportive psychotherapy efforts and medications as indicated for patient's diagnosis.  Pharmacological and Non-Pharmacological treatment options discussed during today's visit. Patient/Guardian acknowledged and verbally consented with current treatment plan and was educated on the importance of compliance with treatment and follow-up appointments.      MEDICATION ISSUES:  Discussed medication options and treatment plan of prescribed medication as well as the risks, benefits, any black box warnings, and side effects including potential falls, possible impaired driving, and metabolic adversities among others. Patient is agreeable to call the office with any worsening of symptoms or onset of side effects, or if any concerns or questions arise.  The contact information for the office is made available to the  patient. Patient is agreeable to call 911 or go to the nearest ER should they begin having any SI/HI, or if any urgent concerns arise. No medication side effects or related complaints today.     MEDS ORDERED DURING VISIT:  No orders of the defined types were placed in this encounter.      FOLLOW UP:  Return in about 3 months (around 9/24/2024) for Recheck.             This document has been electronically signed by SHANELLE Galvez  June 24, 2024 11:03 EDT    Please note that portions of this note were completed with a voice recognition program. Efforts were made to edit dictation, but occasionally words are mistranscribed.

## 2024-06-26 ENCOUNTER — TELEPHONE (OUTPATIENT)
Dept: FAMILY MEDICINE CLINIC | Facility: CLINIC | Age: 44
End: 2024-06-26
Payer: MEDICAID

## 2024-06-26 DIAGNOSIS — J44.9 CHRONIC OBSTRUCTIVE PULMONARY DISEASE, UNSPECIFIED COPD TYPE: ICD-10-CM

## 2024-06-26 DIAGNOSIS — G47.34 NOCTURNAL HYPOXIA: Primary | ICD-10-CM

## 2024-06-26 NOTE — TELEPHONE ENCOUNTER
Inform pt her overnight oxygen study did confirm that she needs oxygen while sleeping. Oxygen order entered. Please fax to appropriate DME.

## 2024-07-01 RX ORDER — PROPRANOLOL HYDROCHLORIDE 20 MG/1
TABLET ORAL
Qty: 30 TABLET | Refills: 0 | OUTPATIENT
Start: 2024-07-01

## 2024-07-15 DIAGNOSIS — M50.30 DDD (DEGENERATIVE DISC DISEASE), CERVICAL: ICD-10-CM

## 2024-07-15 DIAGNOSIS — M54.12 CERVICAL RADICULOPATHY: ICD-10-CM

## 2024-07-15 DIAGNOSIS — E61.1 IRON DEFICIENCY: ICD-10-CM

## 2024-07-15 DIAGNOSIS — M51.36 DDD (DEGENERATIVE DISC DISEASE), LUMBAR: ICD-10-CM

## 2024-07-15 DIAGNOSIS — G56.03 BILATERAL CARPAL TUNNEL SYNDROME: ICD-10-CM

## 2024-07-15 RX ORDER — FERROUS GLUCONATE 324(38)MG
324 TABLET ORAL 2 TIMES DAILY WITH MEALS
Qty: 60 TABLET | Refills: 1 | Status: SHIPPED | OUTPATIENT
Start: 2024-07-15

## 2024-07-15 RX ORDER — PREGABALIN 200 MG/1
200 CAPSULE ORAL 3 TIMES DAILY
Qty: 90 CAPSULE | Refills: 0 | Status: SHIPPED | OUTPATIENT
Start: 2024-07-15

## 2024-07-15 NOTE — TELEPHONE ENCOUNTER
Rx Refill Note  Requested Prescriptions     Pending Prescriptions Disp Refills    pregabalin (Lyrica) 200 MG capsule 90 capsule 0     Sig: Take 1 capsule by mouth 3 (Three) Times a Day.     Signed Prescriptions Disp Refills    ferrous gluconate (FERGON) 324 MG tablet 60 tablet 1     Sig: Take 1 tablet by mouth 2 (Two) Times a Day With Meals. Or take with vitamin C.     Authorizing Provider: ELINA SULLIVAN     Ordering User: MONICA SELBY      Last office visit with prescribing clinician: 5/30/2024   Last telemedicine visit with prescribing clinician: Visit date not found   Next office visit with prescribing clinician: 9/5/2024                         Would you like a call back once the refill request has been completed: [] Yes [] No    If the office needs to give you a call back, can they leave a voicemail: [] Yes [] No    Monica Selby MA  07/15/24, 11:53 EDT

## 2024-07-31 DIAGNOSIS — F51.5 NIGHTMARES: ICD-10-CM

## 2024-07-31 RX ORDER — PRAZOSIN HYDROCHLORIDE 2 MG/1
2 CAPSULE ORAL NIGHTLY
Qty: 30 CAPSULE | Refills: 1 | Status: SHIPPED | OUTPATIENT
Start: 2024-07-31

## 2024-08-06 DIAGNOSIS — F41.1 GENERALIZED ANXIETY DISORDER: ICD-10-CM

## 2024-08-06 DIAGNOSIS — F33.1 MODERATE EPISODE OF RECURRENT MAJOR DEPRESSIVE DISORDER: ICD-10-CM

## 2024-08-06 DIAGNOSIS — F43.10 POST TRAUMATIC STRESS DISORDER (PTSD): ICD-10-CM

## 2024-08-06 RX ORDER — FLUOXETINE HYDROCHLORIDE 40 MG/1
40 CAPSULE ORAL DAILY
Qty: 30 CAPSULE | Refills: 0 | Status: SHIPPED | OUTPATIENT
Start: 2024-08-06

## 2024-08-12 DIAGNOSIS — M50.30 DDD (DEGENERATIVE DISC DISEASE), CERVICAL: ICD-10-CM

## 2024-08-12 DIAGNOSIS — M51.36 DDD (DEGENERATIVE DISC DISEASE), LUMBAR: ICD-10-CM

## 2024-08-12 DIAGNOSIS — M54.12 CERVICAL RADICULOPATHY: ICD-10-CM

## 2024-08-12 DIAGNOSIS — G56.03 BILATERAL CARPAL TUNNEL SYNDROME: ICD-10-CM

## 2024-08-14 RX ORDER — PREGABALIN 200 MG/1
200 CAPSULE ORAL 3 TIMES DAILY
Qty: 90 CAPSULE | Refills: 0 | Status: SHIPPED | OUTPATIENT
Start: 2024-08-14

## 2024-09-02 DIAGNOSIS — F41.1 GENERALIZED ANXIETY DISORDER: ICD-10-CM

## 2024-09-02 DIAGNOSIS — E11.65 TYPE 2 DIABETES MELLITUS WITH HYPERGLYCEMIA, WITHOUT LONG-TERM CURRENT USE OF INSULIN: ICD-10-CM

## 2024-09-02 DIAGNOSIS — F33.1 MODERATE EPISODE OF RECURRENT MAJOR DEPRESSIVE DISORDER: ICD-10-CM

## 2024-09-02 DIAGNOSIS — F43.10 POST TRAUMATIC STRESS DISORDER (PTSD): ICD-10-CM

## 2024-09-03 RX ORDER — FLUOXETINE 40 MG/1
40 CAPSULE ORAL DAILY
Qty: 30 CAPSULE | Refills: 0 | Status: SHIPPED | OUTPATIENT
Start: 2024-09-03

## 2024-09-04 RX ORDER — SITAGLIPTIN 100 MG/1
100 TABLET, FILM COATED ORAL DAILY
Qty: 30 TABLET | Refills: 11 | Status: SHIPPED | OUTPATIENT
Start: 2024-09-04 | End: 2024-09-05

## 2024-09-05 ENCOUNTER — OFFICE VISIT (OUTPATIENT)
Dept: FAMILY MEDICINE CLINIC | Facility: CLINIC | Age: 44
End: 2024-09-05
Payer: MEDICAID

## 2024-09-05 VITALS
WEIGHT: 233.6 LBS | HEIGHT: 62 IN | SYSTOLIC BLOOD PRESSURE: 128 MMHG | DIASTOLIC BLOOD PRESSURE: 84 MMHG | HEART RATE: 80 BPM | OXYGEN SATURATION: 96 % | BODY MASS INDEX: 42.99 KG/M2

## 2024-09-05 DIAGNOSIS — I10 ESSENTIAL HYPERTENSION: ICD-10-CM

## 2024-09-05 DIAGNOSIS — M51.36 DDD (DEGENERATIVE DISC DISEASE), LUMBAR: ICD-10-CM

## 2024-09-05 DIAGNOSIS — J44.9 CHRONIC OBSTRUCTIVE PULMONARY DISEASE, UNSPECIFIED COPD TYPE: ICD-10-CM

## 2024-09-05 DIAGNOSIS — L40.0 PLAQUE PSORIASIS: ICD-10-CM

## 2024-09-05 DIAGNOSIS — E11.65 TYPE 2 DIABETES MELLITUS WITH HYPERGLYCEMIA, WITHOUT LONG-TERM CURRENT USE OF INSULIN: ICD-10-CM

## 2024-09-05 DIAGNOSIS — E11.69 TYPE 2 DIABETES MELLITUS WITH OTHER SPECIFIED COMPLICATION, WITHOUT LONG-TERM CURRENT USE OF INSULIN: ICD-10-CM

## 2024-09-05 DIAGNOSIS — G56.03 BILATERAL CARPAL TUNNEL SYNDROME: ICD-10-CM

## 2024-09-05 DIAGNOSIS — M54.12 CERVICAL RADICULOPATHY: ICD-10-CM

## 2024-09-05 DIAGNOSIS — E61.1 IRON DEFICIENCY: ICD-10-CM

## 2024-09-05 DIAGNOSIS — Z00.00 ENCOUNTER FOR PREVENTIVE HEALTH EXAMINATION: Primary | ICD-10-CM

## 2024-09-05 DIAGNOSIS — E53.8 B12 DEFICIENCY: ICD-10-CM

## 2024-09-05 DIAGNOSIS — G89.29 ACUTE EXACERBATION OF CHRONIC LOW BACK PAIN: ICD-10-CM

## 2024-09-05 DIAGNOSIS — R09.02 HYPOXIA: ICD-10-CM

## 2024-09-05 DIAGNOSIS — M50.30 DDD (DEGENERATIVE DISC DISEASE), CERVICAL: ICD-10-CM

## 2024-09-05 DIAGNOSIS — M54.50 ACUTE EXACERBATION OF CHRONIC LOW BACK PAIN: ICD-10-CM

## 2024-09-05 DIAGNOSIS — Z51.81 MEDICATION MONITORING ENCOUNTER: ICD-10-CM

## 2024-09-05 DIAGNOSIS — Z23 NEED FOR HEPATITIS B BOOSTER VACCINATION: ICD-10-CM

## 2024-09-05 LAB
EXPIRATION DATE: ABNORMAL
HBA1C MFR BLD: 6.8 % (ref 4.5–5.7)
Lab: ABNORMAL

## 2024-09-05 PROCEDURE — 1125F AMNT PAIN NOTED PAIN PRSNT: CPT | Performed by: FAMILY MEDICINE

## 2024-09-05 PROCEDURE — 3074F SYST BP LT 130 MM HG: CPT | Performed by: FAMILY MEDICINE

## 2024-09-05 PROCEDURE — 3044F HG A1C LEVEL LT 7.0%: CPT | Performed by: FAMILY MEDICINE

## 2024-09-05 PROCEDURE — 1160F RVW MEDS BY RX/DR IN RCRD: CPT | Performed by: FAMILY MEDICINE

## 2024-09-05 PROCEDURE — 90746 HEPB VACCINE 3 DOSE ADULT IM: CPT | Performed by: FAMILY MEDICINE

## 2024-09-05 PROCEDURE — 3079F DIAST BP 80-89 MM HG: CPT | Performed by: FAMILY MEDICINE

## 2024-09-05 PROCEDURE — 99214 OFFICE O/P EST MOD 30 MIN: CPT | Performed by: FAMILY MEDICINE

## 2024-09-05 PROCEDURE — 83036 HEMOGLOBIN GLYCOSYLATED A1C: CPT | Performed by: FAMILY MEDICINE

## 2024-09-05 PROCEDURE — 99396 PREV VISIT EST AGE 40-64: CPT | Performed by: FAMILY MEDICINE

## 2024-09-05 PROCEDURE — 90471 IMMUNIZATION ADMIN: CPT | Performed by: FAMILY MEDICINE

## 2024-09-05 PROCEDURE — 96372 THER/PROPH/DIAG INJ SC/IM: CPT | Performed by: FAMILY MEDICINE

## 2024-09-05 PROCEDURE — 1159F MED LIST DOCD IN RCRD: CPT | Performed by: FAMILY MEDICINE

## 2024-09-05 RX ORDER — GLIPIZIDE 5 MG/1
2.5 TABLET ORAL
Start: 2024-09-05

## 2024-09-05 RX ADMIN — CYANOCOBALAMIN 1000 MCG: 1000 INJECTION, SOLUTION INTRAMUSCULAR; SUBCUTANEOUS at 14:50

## 2024-09-05 NOTE — PROGRESS NOTES
Subjective   Clementine Johnson is a 44 y.o. female.     History of Present Illness  Here for annual check up and f/u on chronic med problems.    Reproductive History  A0  Sexual Activity: sexually active heterosexual   Contraception: s/p tubal  Menses: s/p ablation  STD hx: HPV, genital warts. Denies concern regarding recent exposure, declines screening.  H/O abnormal pap: yes, ASCUS with HPV, h/o cervical and vulvar dysplasia  Cervical procedures: colposcopy with biopsy    Social Hx  Household members: self, son  Marital status:   Tobacco use: former, 30 pk year hx  EtOH use: no  Illicit drug use: no, h/o substance use DO  Working part time.    Lifestyle  Diet: excess calorie, no diabetic appropriate  Exercise: no formal  Using seatbelt: yes  Mental Health: stable, denies worsening depression, no SI  Feels safe in home: yes    Screenings  Last pap: 2024  Last mammogram: 2024  Colonoscopy: n/a  DEXA: n/a  FLP: <1 year  BG/A1c: < 1 year  Vitamin D: unsure, h/o deficiency  Last dental check up: 2024, usually goes once yearly  Last vision exam: 1 year ago, mild retinopathy  Immunizations UTD: no  Needs: COVID booster, seasonal flu vaccine    Dx'd with [facility]. Workingon getting her CPAP    Seeing [facility], for neck, getting botox q 3 months    Low iron and b12 in past.    BG improving. Having some lows.    Requesting refill of Lyrica. Taking for Cervical radiculopathy, DPN.     Requesting refill of pepcid.    Taking ace-inh for HTN.    Has quit smoking.    She c/o worsening low back pain. Bilateral, aching/burniing. Worse with prolonged standing, bending, lifting, twisting. Chronic BLE pain but no worse since exacerbation of back pain.    The following portions of the patient's history were reviewed and updated as appropriate: allergies, current medications, past family history, past medical history, past social history, past surgical history, and problem list.    Review of Systems    Constitutional:  Positive for fatigue. Negative for chills, fever and unexpected weight change.   HENT:  Positive for congestion and postnasal drip. Negative for ear pain, mouth sores, nosebleeds, rhinorrhea, sinus pain, sore throat and trouble swallowing.    Eyes:  Negative for pain, discharge and visual disturbance.   Respiratory:  Positive for shortness of breath (mild with exertion) and wheezing (intermittent). Negative for cough and chest tightness.    Cardiovascular:  Positive for leg swelling (stable). Negative for chest pain and palpitations.   Gastrointestinal:  Positive for diarrhea (mild, chronic, intermittent). Negative for abdominal pain, blood in stool, constipation, nausea and vomiting.   Endocrine: Positive for heat intolerance. Negative for polydipsia and polyuria.   Genitourinary:  Negative for dysuria and hematuria.   Musculoskeletal:  Positive for arthralgias, back pain, myalgias, neck pain and neck stiffness. Negative for joint swelling.   Skin:  Positive for rash (chronic). Negative for wound.   Neurological:  Positive for numbness and headaches. Negative for dizziness, tremors, syncope and weakness.   Hematological:  Negative for adenopathy. Does not bruise/bleed easily.   Psychiatric/Behavioral:  Positive for dysphoric mood and sleep disturbance. Negative for confusion and suicidal ideas. The patient is nervous/anxious.    Pt's previous ROS reviewed and updated as indicated.       Objective   Vitals:    09/05/24 1102   BP: 128/84   Pulse: 80   SpO2: 96%     Body mass index is 42.73 kg/m².      09/05/24  1102   Weight: 106 kg (233 lb 9.6 oz)       Physical Exam  Vitals and nursing note reviewed.   Constitutional:       Appearance: She is well-groomed. She is obese. She is not ill-appearing.   HENT:      Head: Atraumatic.      Right Ear: Tympanic membrane, ear canal and external ear normal.      Left Ear: Tympanic membrane, ear canal and external ear normal.      Nose: Nose normal.       Mouth/Throat:      Mouth: Mucous membranes are moist. No oral lesions.      Pharynx: Oropharynx is clear.   Eyes:      General: No scleral icterus.     Conjunctiva/sclera: Conjunctivae normal.   Neck:      Thyroid: No thyroid mass.      Vascular: Normal carotid pulses. No carotid bruit.   Cardiovascular:      Rate and Rhythm: Normal rate and regular rhythm.      Pulses: Normal pulses.      Heart sounds: Normal heart sounds.   Pulmonary:      Effort: Pulmonary effort is normal. No respiratory distress.      Breath sounds: Decreased breath sounds (mildly) present. No wheezing, rhonchi or rales.   Abdominal:      General: Bowel sounds are normal. There is no distension.      Palpations: Abdomen is soft. There is no mass.      Tenderness: There is no abdominal tenderness.   Musculoskeletal:      Lumbar back: Spasms, tenderness (diffuse soft tissue) and bony tenderness (L3-S1) present. Decreased range of motion (mildly). Negative right straight leg raise test and negative left straight leg raise test.      Right lower leg: Edema (mild) present.      Left lower leg: Edema (mild) present.   Lymphadenopathy:      Cervical: No cervical adenopathy.   Skin:     General: Skin is warm and dry.      Coloration: Skin is not jaundiced or pale.      Findings: Rash (scaling patches) present. No bruising.   Neurological:      Mental Status: She is alert and oriented to person, place, and time.      Gait: Gait is intact.   Psychiatric:         Mood and Affect: Mood and affect normal.         Behavior: Behavior normal. Behavior is cooperative.         Cognition and Memory: Cognition normal.     Pt's previous physical exam reviewed and updated as indicated.    Lab Results   Component Value Date    HGBA1C 6.8 (A) 09/05/2024    HGBA1C 7.1 (A) 02/19/2024    HGBA1C 6.70 (H) 11/15/2023     Lab Results   Component Value Date    MICROALBUR 41.2 09/05/2024    CREATININE 0.62 09/05/2024         Assessment & Plan   Diagnoses and all orders for  this visit:    1. Encounter for preventive health examination (Primary)    2. Type 2 diabetes mellitus with other specified complication, without long-term current use of insulin  -     POC Glycosylated Hemoglobin (Hb A1C)  -     Tirzepatide (MOUNJARO) 2.5 MG/0.5ML solution pen-injector pen; Inject 0.5 mL under the skin into the appropriate area as directed 1 (One) Time Per Week.  Dispense: 2 mL; Refill: 0  -     Comprehensive Metabolic Panel  -     Microalbumin / Creatinine Urine Ratio - Urine, Clean Catch  -     Lipid Panel  -     TSH Rfx On Abnormal To Free T4    3. Chronic obstructive pulmonary disease, unspecified COPD type  -     Misc. Devices (Pulse Oximeter) misc; Use 1 each Take As Directed.  Dispense: 1 each; Refill: 0  -     CBC & Differential    4. Hypoxia  -     Misc. Devices (Pulse Oximeter) misc; Use 1 each Take As Directed.  Dispense: 1 each; Refill: 0    5. B12 deficiency  -     Vitamin B12  -     CBC & Differential    6. Plaque psoriasis  -     CBC & Differential  -     Sedimentation Rate  -     C-reactive protein    7. Essential hypertension  -     CBC & Differential  -     Comprehensive Metabolic Panel  -     Microalbumin / Creatinine Urine Ratio - Urine, Clean Catch  -     TSH Rfx On Abnormal To Free T4    8. Medication monitoring encounter  -     CBC & Differential  -     Comprehensive Metabolic Panel    9. Iron deficiency  -     Iron Profile  -     Ferritin    10. Need for hepatitis B booster vaccination  -     Hepatitis B Vaccine Adult IM (ENGERIX/RECOMBIVAX)    11. Type 2 diabetes mellitus with hyperglycemia, without long-term current use of insulin  -     glipizide (GLUCOTROL) 5 MG tablet; Take 0.5 tablets by mouth 2 (Two) Times a Day Before Meals.    12. Acute exacerbation of chronic low back pain  -     XR Spine Lumbar 2 or 3 View (In Office)    13. DDD (degenerative disc disease), cervical  -     pregabalin (Lyrica) 200 MG capsule; Take 1 capsule by mouth 3 (Three) Times a Day.   Dispense: 90 capsule; Refill: 0    14. DDD (degenerative disc disease), lumbar  -     pregabalin (Lyrica) 200 MG capsule; Take 1 capsule by mouth 3 (Three) Times a Day.  Dispense: 90 capsule; Refill: 0    15. Cervical radiculopathy  -     pregabalin (Lyrica) 200 MG capsule; Take 1 capsule by mouth 3 (Three) Times a Day.  Dispense: 90 capsule; Refill: 0    16. Bilateral carpal tunnel syndrome  -     pregabalin (Lyrica) 200 MG capsule; Take 1 capsule by mouth 3 (Three) Times a Day.  Dispense: 90 capsule; Refill: 0    Other orders  -     famotidine (Pepcid) 40 MG tablet; Take 1 tablet by mouth 2 (Two) Times a Day.  Dispense: 60 tablet; Refill: 2       NIDDM controlled. Decrease glipizide by 1/2. Continue metformin, januvia, ace-inh. We will attempt to replace Januvia with Mounjaro if covered by insurance. Patient encouraged to take meds as rx'd, follow diabetic appropriate diet, exercise daily, perform feet check daily, and have yearly diabetic eye exams.    Assess status of vit/min deficiencies and replace as indicated.    As part of patient's treatment plan I am prescribing a controlled substance.  The patient has been made aware of the appropriate use of such medications, including potential risk of somnolence, limited ability to drive and/or work safely, and potential for dependence and/or overdose.  It has also been made clear that these medications are for use by this patient only, without concomitant use of alcohol or other substances, unless prescribed.  History and physical exam exhibit continued safe and appropriate use of controlled substance.  JESSIE reviewed.  Patient has completed a prescribing agreement detailing terms of continued prescribing of controlled substances, including monitoring JESSIE reports, urine drug screening, and pill counts if necessary.  Patient is aware that inappropriate use will result in cessation of prescribing such medications.    F/u in 3 months, sooner as needed/instructed.  I  will contact patient regarding test results and provide instructions regarding any necessary changes in plan of care.  Patient was encouraged to keep me informed of any acute changes, lack of improvement, or any new concerning symptoms.  Pt is aware of reasons to seek emergent care.  Patient voiced understanding of all instructions and denied further questions.    Please note that portions of this note may have been completed with a voice recognition program.

## 2024-09-06 LAB
ALBUMIN SERPL-MCNC: 4 G/DL (ref 3.5–5.2)
ALBUMIN/CREAT UR: 29 MG/G CREAT (ref 0–29)
ALBUMIN/GLOB SERPL: 1.5 G/DL
ALP SERPL-CCNC: 100 U/L (ref 39–117)
ALT SERPL-CCNC: 12 U/L (ref 1–33)
AST SERPL-CCNC: 16 U/L (ref 1–32)
BASOPHILS # BLD AUTO: 0.06 10*3/MM3 (ref 0–0.2)
BASOPHILS NFR BLD AUTO: 0.6 % (ref 0–1.5)
BILIRUB SERPL-MCNC: 1.2 MG/DL (ref 0–1.2)
BUN SERPL-MCNC: 9 MG/DL (ref 6–20)
BUN/CREAT SERPL: 14.5 (ref 7–25)
CALCIUM SERPL-MCNC: 8.8 MG/DL (ref 8.6–10.5)
CHLORIDE SERPL-SCNC: 102 MMOL/L (ref 98–107)
CHOLEST SERPL-MCNC: 135 MG/DL (ref 0–200)
CO2 SERPL-SCNC: 27 MMOL/L (ref 22–29)
CREAT SERPL-MCNC: 0.62 MG/DL (ref 0.57–1)
CREAT UR-MCNC: 143.9 MG/DL
CRP SERPL-MCNC: 1.66 MG/DL (ref 0–0.5)
EGFRCR SERPLBLD CKD-EPI 2021: 113.5 ML/MIN/1.73
EOSINOPHIL # BLD AUTO: 0.08 10*3/MM3 (ref 0–0.4)
EOSINOPHIL NFR BLD AUTO: 0.9 % (ref 0.3–6.2)
ERYTHROCYTE [DISTWIDTH] IN BLOOD BY AUTOMATED COUNT: 13.7 % (ref 12.3–15.4)
ERYTHROCYTE [SEDIMENTATION RATE] IN BLOOD BY WESTERGREN METHOD: 20 MM/HR (ref 0–20)
FERRITIN SERPL-MCNC: 255 NG/ML (ref 13–150)
GLOBULIN SER CALC-MCNC: 2.6 GM/DL
GLUCOSE SERPL-MCNC: 100 MG/DL (ref 65–99)
HCT VFR BLD AUTO: 36.6 % (ref 34–46.6)
HDLC SERPL-MCNC: 43 MG/DL (ref 40–60)
HGB BLD-MCNC: 11.8 G/DL (ref 12–15.9)
IMM GRANULOCYTES # BLD AUTO: 0.03 10*3/MM3 (ref 0–0.05)
IMM GRANULOCYTES NFR BLD AUTO: 0.3 % (ref 0–0.5)
IRON SATN MFR SERPL: 9 % (ref 20–50)
IRON SERPL-MCNC: 38 MCG/DL (ref 37–145)
LDLC SERPL CALC-MCNC: 67 MG/DL (ref 0–100)
LYMPHOCYTES # BLD AUTO: 1.66 10*3/MM3 (ref 0.7–3.1)
LYMPHOCYTES NFR BLD AUTO: 17.8 % (ref 19.6–45.3)
MCH RBC QN AUTO: 28.1 PG (ref 26.6–33)
MCHC RBC AUTO-ENTMCNC: 32.2 G/DL (ref 31.5–35.7)
MCV RBC AUTO: 87.1 FL (ref 79–97)
MICROALBUMIN UR-MCNC: 41.2 UG/ML
MONOCYTES # BLD AUTO: 0.58 10*3/MM3 (ref 0.1–0.9)
MONOCYTES NFR BLD AUTO: 6.2 % (ref 5–12)
NEUTROPHILS # BLD AUTO: 6.93 10*3/MM3 (ref 1.7–7)
NEUTROPHILS NFR BLD AUTO: 74.2 % (ref 42.7–76)
NRBC BLD AUTO-RTO: 0 /100 WBC (ref 0–0.2)
PLATELET # BLD AUTO: 283 10*3/MM3 (ref 140–450)
POTASSIUM SERPL-SCNC: 4.1 MMOL/L (ref 3.5–5.2)
PROT SERPL-MCNC: 6.6 G/DL (ref 6–8.5)
RBC # BLD AUTO: 4.2 10*6/MM3 (ref 3.77–5.28)
SODIUM SERPL-SCNC: 141 MMOL/L (ref 136–145)
TIBC SERPL-MCNC: 411 MCG/DL
TRIGL SERPL-MCNC: 145 MG/DL (ref 0–150)
TSH SERPL DL<=0.005 MIU/L-ACNC: 0.97 UIU/ML (ref 0.27–4.2)
UIBC SERPL-MCNC: 373 MCG/DL (ref 112–346)
VIT B12 SERPL-MCNC: 422 PG/ML (ref 211–946)
VLDLC SERPL CALC-MCNC: 25 MG/DL (ref 5–40)
WBC # BLD AUTO: 9.34 10*3/MM3 (ref 3.4–10.8)

## 2024-09-10 ENCOUNTER — OFFICE VISIT (OUTPATIENT)
Dept: OBSTETRICS AND GYNECOLOGY | Facility: CLINIC | Age: 44
End: 2024-09-10
Payer: MEDICAID

## 2024-09-10 VITALS — BODY MASS INDEX: 43.24 KG/M2 | HEIGHT: 62 IN | WEIGHT: 235 LBS

## 2024-09-10 DIAGNOSIS — N83.201 CYST OF RIGHT OVARY: Primary | ICD-10-CM

## 2024-09-10 DIAGNOSIS — D07.1 VULVAR INTRAEPITHELIAL NEOPLASIA (VIN) GRADE 3: ICD-10-CM

## 2024-09-10 DIAGNOSIS — Z98.51 S/P TUBAL LIGATION: ICD-10-CM

## 2024-09-10 DIAGNOSIS — N93.9 ABNORMAL UTERINE BLEEDING (AUB): ICD-10-CM

## 2024-09-10 DIAGNOSIS — Z98.890 S/P ENDOMETRIAL ABLATION: ICD-10-CM

## 2024-09-10 PROCEDURE — 99213 OFFICE O/P EST LOW 20 MIN: CPT | Performed by: OBSTETRICS & GYNECOLOGY

## 2024-09-11 RX ORDER — PREGABALIN 200 MG/1
200 CAPSULE ORAL 3 TIMES DAILY
Qty: 90 CAPSULE | Refills: 0 | Status: SHIPPED | OUTPATIENT
Start: 2024-09-11

## 2024-09-11 RX ORDER — FAMOTIDINE 40 MG/1
40 TABLET, FILM COATED ORAL 2 TIMES DAILY
Qty: 60 TABLET | Refills: 2 | Status: SHIPPED | OUTPATIENT
Start: 2024-09-11

## 2024-09-12 DIAGNOSIS — E11.65 TYPE 2 DIABETES MELLITUS WITH HYPERGLYCEMIA, WITHOUT LONG-TERM CURRENT USE OF INSULIN: ICD-10-CM

## 2024-09-12 RX ORDER — METFORMIN HCL 500 MG
1000 TABLET, EXTENDED RELEASE 24 HR ORAL
Qty: 60 TABLET | Refills: 11 | Status: SHIPPED | OUTPATIENT
Start: 2024-09-12

## 2024-09-17 ENCOUNTER — TELEMEDICINE (OUTPATIENT)
Dept: PSYCHIATRY | Facility: CLINIC | Age: 44
End: 2024-09-17
Payer: MEDICAID

## 2024-09-17 DIAGNOSIS — F51.5 NIGHTMARES: ICD-10-CM

## 2024-09-17 DIAGNOSIS — F41.1 GENERALIZED ANXIETY DISORDER: Primary | ICD-10-CM

## 2024-09-17 DIAGNOSIS — F33.1 MODERATE EPISODE OF RECURRENT MAJOR DEPRESSIVE DISORDER: ICD-10-CM

## 2024-09-17 DIAGNOSIS — F43.10 POST TRAUMATIC STRESS DISORDER (PTSD): ICD-10-CM

## 2024-09-17 PROCEDURE — 99214 OFFICE O/P EST MOD 30 MIN: CPT | Performed by: NURSE PRACTITIONER

## 2024-09-17 PROCEDURE — 1160F RVW MEDS BY RX/DR IN RCRD: CPT | Performed by: NURSE PRACTITIONER

## 2024-09-17 PROCEDURE — 1159F MED LIST DOCD IN RCRD: CPT | Performed by: NURSE PRACTITIONER

## 2024-09-17 RX ORDER — BUSPIRONE HYDROCHLORIDE 15 MG/1
15 TABLET ORAL 4 TIMES DAILY
Qty: 120 TABLET | Refills: 2 | Status: SHIPPED | OUTPATIENT
Start: 2024-09-17

## 2024-09-17 RX ORDER — FLUOXETINE 40 MG/1
40 CAPSULE ORAL DAILY
Qty: 30 CAPSULE | Refills: 2 | Status: SHIPPED | OUTPATIENT
Start: 2024-09-17

## 2024-09-17 RX ORDER — PRAZOSIN HYDROCHLORIDE 2 MG/1
2 CAPSULE ORAL NIGHTLY
Qty: 30 CAPSULE | Refills: 2 | Status: SHIPPED | OUTPATIENT
Start: 2024-09-17

## 2024-10-01 ENCOUNTER — TELEPHONE (OUTPATIENT)
Dept: FAMILY MEDICINE CLINIC | Facility: CLINIC | Age: 44
End: 2024-10-01
Payer: MEDICAID

## 2024-10-01 ENCOUNTER — TELEPHONE (OUTPATIENT)
Dept: FAMILY MEDICINE CLINIC | Facility: CLINIC | Age: 44
End: 2024-10-01

## 2024-10-01 DIAGNOSIS — G89.29 ACUTE EXACERBATION OF CHRONIC LOW BACK PAIN: ICD-10-CM

## 2024-10-01 DIAGNOSIS — M54.2 CHRONIC NECK PAIN: ICD-10-CM

## 2024-10-01 DIAGNOSIS — G89.29 CHRONIC NECK PAIN: ICD-10-CM

## 2024-10-01 DIAGNOSIS — M51.369 DEGENERATION OF INTERVERTEBRAL DISC OF LUMBAR REGION, UNSPECIFIED WHETHER PAIN PRESENT: Primary | ICD-10-CM

## 2024-10-01 DIAGNOSIS — M54.12 CERVICAL RADICULOPATHY: ICD-10-CM

## 2024-10-01 DIAGNOSIS — M54.50 ACUTE EXACERBATION OF CHRONIC LOW BACK PAIN: ICD-10-CM

## 2024-10-01 DIAGNOSIS — M50.30 DDD (DEGENERATIVE DISC DISEASE), CERVICAL: ICD-10-CM

## 2024-10-01 NOTE — TELEPHONE ENCOUNTER
Caller: Alex Clementineraul Faye    Relationship: Self    Best call back number: [phone]     What is the medical concern/diagnosis: NECK AND BACK ISSUES.    What specialty or service is being requested: PT    What is the provider, practice or medical service name: [address]    What is the office location: [place]    What is the office phone number: [phone]  [phone]    Any additional details: PT NEEDS UPDATED REFERRAL FOR PT ON NECK AND BACK. PT'S NEXT APPT IS ON 10-07-24.

## 2024-10-01 NOTE — TELEPHONE ENCOUNTER
Caller: Clementine Johnson    Relationship: Self    Best call back number: [phone]     What orders are you requesting (i.e. lab or imaging): IMAGING    In what timeframe would the patient need to come in: ASAP    Where will you receive your lab/imaging services: ?    Additional notes: PT STATED SHE HAS NOT BEEN CALL FOR AN APPT FOR HER X-RAY. PLEASE LET PT KNOW WHEN AND WHERE HER X-RAY IS TO TAKE PLACE. X-RAY IS FOR LOWER BACK.

## 2024-10-02 ENCOUNTER — TELEPHONE (OUTPATIENT)
Dept: FAMILY MEDICINE CLINIC | Facility: CLINIC | Age: 44
End: 2024-10-02
Payer: MEDICAID

## 2024-10-02 NOTE — TELEPHONE ENCOUNTER
Caller: Clementine Johnson    Relationship to patient: Self    Best call back number: [phone]     Patient is needing: PATIENT CALLED TO INFORM US THAT THE PRIOR AUTHORIZATION FOR HER PRESCRIPTION OF Tirzepatide (MOUNJARO) 2.5 MG/0.5ML solution pen-injector pen CAME BACK AS DENIED WHEN HER PHARMACY WENT TO CHECK    PLEASE START THE PRIOR AUTHORIZATION PROCESS FOR THIS MEDICATION IF NOT ALREADY STARTED

## 2024-10-07 DIAGNOSIS — M51.369 DDD (DEGENERATIVE DISC DISEASE), LUMBAR: ICD-10-CM

## 2024-10-07 DIAGNOSIS — M54.12 CERVICAL RADICULOPATHY: ICD-10-CM

## 2024-10-07 DIAGNOSIS — M50.30 DDD (DEGENERATIVE DISC DISEASE), CERVICAL: ICD-10-CM

## 2024-10-07 DIAGNOSIS — G56.03 BILATERAL CARPAL TUNNEL SYNDROME: ICD-10-CM

## 2024-10-07 RX ORDER — PREGABALIN 200 MG/1
200 CAPSULE ORAL 3 TIMES DAILY
Qty: 90 CAPSULE | Refills: 1 | Status: SHIPPED | OUTPATIENT
Start: 2024-10-07

## 2024-10-07 NOTE — TELEPHONE ENCOUNTER
Rx Refill Note  Requested Prescriptions     Pending Prescriptions Disp Refills    pregabalin (Lyrica) 200 MG capsule 90 capsule 0     Sig: Take 1 capsule by mouth 3 (Three) Times a Day.      Last office visit with prescribing clinician: 9/5/2024   Last telemedicine visit with prescribing clinician: Visit date not found   Next office visit with prescribing clinician: 12/4/2024                         Would you like a call back once the refill request has been completed: [] Yes [] No    If the office needs to give you a call back, can they leave a voicemail: [] Yes [] No    Tova Medrano CMA  10/07/24, 08:58 EDT

## 2024-10-10 DIAGNOSIS — E11.65 TYPE 2 DIABETES MELLITUS WITH HYPERGLYCEMIA, WITHOUT LONG-TERM CURRENT USE OF INSULIN: ICD-10-CM

## 2024-10-10 RX ORDER — GLIPIZIDE 5 MG/1
5 TABLET ORAL
Qty: 60 TABLET | Refills: 11 | Status: SHIPPED | OUTPATIENT
Start: 2024-10-10

## 2024-10-18 ENCOUNTER — PRIOR AUTHORIZATION (OUTPATIENT)
Age: 44
End: 2024-10-18
Payer: MEDICAID

## 2024-10-30 ENCOUNTER — TELEPHONE (OUTPATIENT)
Dept: FAMILY MEDICINE CLINIC | Facility: CLINIC | Age: 44
End: 2024-10-30

## 2024-10-30 NOTE — TELEPHONE ENCOUNTER
Caller: Clementine Johnson    Relationship: Self    Best call back number:       [phone] (Home)     Which medication are you concerned about:       Tirzepatide (MOUNJARO) 2.5 MG/0.5ML solution pen-injector pen     Who prescribed you this medication:     DR SULLIVAN    What are your concerns:     PATIENT STATED SHE WAS IN CONTACT WITH INSURANCE TO CHECK STATUS OF MEDICATION FOR FILL    PATIENT STATED INSURANCE INFORMED HER THAT IT TOOK TOO LONG FOR THE PRIOR AUTHORIZATION, WHICH HAS NOW TIMED OUT    INSURANCE REQUESTED A NEW PRESCRIPTION FOR MEDICATION ALONG WITH NEW PRIOR AUTHORIZATION FOR REVIEW    PATIENT REQUESTED DR SULLIVAN ALSO INCLUDE IN THE PRIOR AUTHORIZATION THAT PATIENT IS DIABETIC    PREFERRED PHARMACY:    [facility] - [place], [place]    TELEPHONE CONTACT:    [phone]

## 2024-10-30 NOTE — TELEPHONE ENCOUNTER
Please check with pt regarding why medication was denied. Do they have a preference for her to try first?

## 2024-10-31 ENCOUNTER — TELEPHONE (OUTPATIENT)
Dept: FAMILY MEDICINE CLINIC | Facility: CLINIC | Age: 44
End: 2024-10-31

## 2024-10-31 NOTE — TELEPHONE ENCOUNTER
Please review recent phone message from pt regarding Keisha LAZARO   walker tray; Pt owns RW, commode and reacher/rolling walker/dressing/bathing/toileting

## 2024-11-04 DIAGNOSIS — M54.12 CERVICAL RADICULOPATHY: ICD-10-CM

## 2024-11-04 DIAGNOSIS — M50.30 DDD (DEGENERATIVE DISC DISEASE), CERVICAL: ICD-10-CM

## 2024-11-04 DIAGNOSIS — M51.369 DDD (DEGENERATIVE DISC DISEASE), LUMBAR: ICD-10-CM

## 2024-11-04 DIAGNOSIS — G56.03 BILATERAL CARPAL TUNNEL SYNDROME: ICD-10-CM

## 2024-11-04 RX ORDER — PREGABALIN 200 MG/1
200 CAPSULE ORAL 3 TIMES DAILY
Qty: 90 CAPSULE | Refills: 0 | Status: SHIPPED | OUTPATIENT
Start: 2024-11-04

## 2024-11-04 NOTE — TELEPHONE ENCOUNTER
Rx Refill Note  Requested Prescriptions     Pending Prescriptions Disp Refills    pregabalin (Lyrica) 200 MG capsule 90 capsule 1     Sig: Take 1 capsule by mouth 3 (Three) Times a Day.      Last office visit with prescribing clinician: 9/5/2024   Last telemedicine visit with prescribing clinician: Visit date not found   Next office visit with prescribing clinician: 12/4/2024                         Would you like a call back once the refill request has been completed: [] Yes [] No    If the office needs to give you a call back, can they leave a voicemail: [] Yes [] No    Tova Medrano CMA  11/04/24, 09:20 EST

## 2024-11-05 ENCOUNTER — TELEPHONE (OUTPATIENT)
Dept: FAMILY MEDICINE CLINIC | Facility: CLINIC | Age: 44
End: 2024-11-05
Payer: MEDICAID

## 2024-11-05 NOTE — TELEPHONE ENCOUNTER
Tirzepatide (MOUNJARO) 2.5 MG/0.5ML solution pen-injector pen  NEEDS ANOTHER PA TO GO THROUGH FOR THIS PATIENT.

## 2024-11-19 DIAGNOSIS — M51.369 DEGENERATION OF INTERVERTEBRAL DISC OF LUMBAR REGION, UNSPECIFIED WHETHER PAIN PRESENT: Primary | ICD-10-CM

## 2024-11-19 DIAGNOSIS — M54.12 CERVICAL RADICULOPATHY: ICD-10-CM

## 2024-11-19 DIAGNOSIS — M50.30 DDD (DEGENERATIVE DISC DISEASE), CERVICAL: ICD-10-CM

## 2024-11-19 RX ORDER — FERROUS GLUCONATE 324(38)MG
TABLET ORAL
Qty: 60 TABLET | Refills: 1 | Status: SHIPPED | OUTPATIENT
Start: 2024-11-19

## 2024-12-02 ENCOUNTER — TELEPHONE (OUTPATIENT)
Dept: FAMILY MEDICINE CLINIC | Facility: CLINIC | Age: 44
End: 2024-12-02
Payer: MEDICAID

## 2024-12-02 DIAGNOSIS — G56.03 BILATERAL CARPAL TUNNEL SYNDROME: ICD-10-CM

## 2024-12-02 DIAGNOSIS — M51.369 DDD (DEGENERATIVE DISC DISEASE), LUMBAR: ICD-10-CM

## 2024-12-02 DIAGNOSIS — M50.30 DDD (DEGENERATIVE DISC DISEASE), CERVICAL: ICD-10-CM

## 2024-12-02 DIAGNOSIS — E55.9 VITAMIN D DEFICIENCY: ICD-10-CM

## 2024-12-02 DIAGNOSIS — M54.12 CERVICAL RADICULOPATHY: ICD-10-CM

## 2024-12-03 RX ORDER — ERGOCALCIFEROL 1.25 MG/1
50000 CAPSULE, LIQUID FILLED ORAL
Qty: 4 CAPSULE | Refills: 2 | Status: SHIPPED | OUTPATIENT
Start: 2024-12-03

## 2024-12-03 NOTE — TELEPHONE ENCOUNTER
Manual JESSIE request pending.    UDS needs to be updated. Let me know when completed and I will send in

## 2024-12-03 NOTE — TELEPHONE ENCOUNTER
Rx Refill Note  Requested Prescriptions     Pending Prescriptions Disp Refills    pregabalin (Lyrica) 200 MG capsule 90 capsule 0     Sig: Take 1 capsule by mouth 3 (Three) Times a Day.      Last office visit with prescribing clinician: 9/5/2024   Last telemedicine visit with prescribing clinician: Visit date not found   Next office visit with prescribing clinician: 12/2/2024     Heather Traore MA  12/03/24, 09:40 EST

## 2024-12-04 RX ORDER — PREGABALIN 200 MG/1
200 CAPSULE ORAL 3 TIMES DAILY
Qty: 90 CAPSULE | Refills: 0 | Status: SHIPPED | OUTPATIENT
Start: 2024-12-04

## 2024-12-23 DIAGNOSIS — F33.1 MODERATE EPISODE OF RECURRENT MAJOR DEPRESSIVE DISORDER: ICD-10-CM

## 2024-12-23 DIAGNOSIS — F41.1 GENERALIZED ANXIETY DISORDER: ICD-10-CM

## 2024-12-23 DIAGNOSIS — F43.10 POST TRAUMATIC STRESS DISORDER (PTSD): ICD-10-CM

## 2024-12-23 RX ORDER — FLUOXETINE 40 MG/1
40 CAPSULE ORAL DAILY
Qty: 30 CAPSULE | Refills: 2 | Status: SHIPPED | OUTPATIENT
Start: 2024-12-23

## 2024-12-26 DIAGNOSIS — G56.03 BILATERAL CARPAL TUNNEL SYNDROME: ICD-10-CM

## 2024-12-26 DIAGNOSIS — M51.369 DDD (DEGENERATIVE DISC DISEASE), LUMBAR: ICD-10-CM

## 2024-12-26 DIAGNOSIS — M50.30 DDD (DEGENERATIVE DISC DISEASE), CERVICAL: ICD-10-CM

## 2024-12-26 DIAGNOSIS — M54.12 CERVICAL RADICULOPATHY: ICD-10-CM

## 2024-12-27 DIAGNOSIS — F51.5 NIGHTMARES: ICD-10-CM

## 2024-12-27 RX ORDER — PRAZOSIN HYDROCHLORIDE 2 MG/1
2 CAPSULE ORAL
Qty: 30 CAPSULE | Refills: 2 | Status: SHIPPED | OUTPATIENT
Start: 2024-12-27

## 2024-12-27 NOTE — TELEPHONE ENCOUNTER
Rx Refill Note  Requested Prescriptions     Pending Prescriptions Disp Refills    pregabalin (Lyrica) 200 MG capsule 90 capsule 0     Sig: Take 1 capsule by mouth 3 (Three) Times a Day.      Last office visit with prescribing clinician: 9/5/2024   Last telemedicine visit with prescribing clinician: Visit date not found   Next office visit with prescribing clinician: 1/6/2025                         Would you like a call back once the refill request has been completed: [] Yes [] No    If the office needs to give you a call back, can they leave a voicemail: [] Yes [] No    Tova Medrano CMA  12/27/24, 09:19 EST

## 2025-01-02 RX ORDER — PREGABALIN 200 MG/1
200 CAPSULE ORAL 3 TIMES DAILY
Qty: 90 CAPSULE | Refills: 2 | Status: SHIPPED | OUTPATIENT
Start: 2025-01-02

## 2025-01-06 ENCOUNTER — OFFICE VISIT (OUTPATIENT)
Dept: FAMILY MEDICINE CLINIC | Facility: CLINIC | Age: 45
End: 2025-01-06
Payer: COMMERCIAL

## 2025-01-06 VITALS
HEIGHT: 62 IN | OXYGEN SATURATION: 95 % | HEART RATE: 78 BPM | DIASTOLIC BLOOD PRESSURE: 94 MMHG | BODY MASS INDEX: 44.9 KG/M2 | WEIGHT: 244 LBS | TEMPERATURE: 98 F | SYSTOLIC BLOOD PRESSURE: 152 MMHG

## 2025-01-06 DIAGNOSIS — E66.01 MORBID OBESITY: ICD-10-CM

## 2025-01-06 DIAGNOSIS — J44.9 CHRONIC OBSTRUCTIVE PULMONARY DISEASE, UNSPECIFIED COPD TYPE: ICD-10-CM

## 2025-01-06 DIAGNOSIS — I10 ESSENTIAL HYPERTENSION: ICD-10-CM

## 2025-01-06 DIAGNOSIS — E11.69 TYPE 2 DIABETES MELLITUS WITH OTHER SPECIFIED COMPLICATION, WITHOUT LONG-TERM CURRENT USE OF INSULIN: ICD-10-CM

## 2025-01-06 DIAGNOSIS — K04.7 ABSCESSED TOOTH: ICD-10-CM

## 2025-01-06 DIAGNOSIS — Z00.00 WELL ADULT EXAM: Primary | ICD-10-CM

## 2025-01-06 DIAGNOSIS — G47.33 OBSTRUCTIVE SLEEP APNEA ON CPAP: ICD-10-CM

## 2025-01-06 DIAGNOSIS — Z23 FLU VACCINE NEED: ICD-10-CM

## 2025-01-06 DIAGNOSIS — E11.65 TYPE 2 DIABETES MELLITUS WITH HYPERGLYCEMIA, WITHOUT LONG-TERM CURRENT USE OF INSULIN: ICD-10-CM

## 2025-01-06 PROBLEM — E11.319 DIABETIC RETINOPATHY ASSOCIATED WITH TYPE 2 DIABETES MELLITUS: Status: ACTIVE | Noted: 2025-01-06

## 2025-01-06 LAB
EXPIRATION DATE: ABNORMAL
HBA1C MFR BLD: 7.7 % (ref 4.5–5.7)
Lab: ABNORMAL

## 2025-01-06 PROCEDURE — 99396 PREV VISIT EST AGE 40-64: CPT | Performed by: FAMILY MEDICINE

## 2025-01-06 PROCEDURE — 90471 IMMUNIZATION ADMIN: CPT | Performed by: FAMILY MEDICINE

## 2025-01-06 PROCEDURE — 83036 HEMOGLOBIN GLYCOSYLATED A1C: CPT | Performed by: FAMILY MEDICINE

## 2025-01-06 PROCEDURE — 90656 IIV3 VACC NO PRSV 0.5 ML IM: CPT | Performed by: FAMILY MEDICINE

## 2025-01-06 PROCEDURE — 99214 OFFICE O/P EST MOD 30 MIN: CPT | Performed by: FAMILY MEDICINE

## 2025-01-06 RX ORDER — SITAGLIPTIN 100 MG/1
1 TABLET, FILM COATED ORAL DAILY
COMMUNITY
Start: 2024-12-30 | End: 2025-01-06 | Stop reason: SDUPTHER

## 2025-01-06 RX ORDER — GLIPIZIDE 2.5 MG/1
2.5 TABLET ORAL
Qty: 60 TABLET | Refills: 5 | Status: SHIPPED | OUTPATIENT
Start: 2025-01-06

## 2025-01-06 NOTE — PROGRESS NOTES
"Subjective   Clementine Johnson is a 44 y.o. female.     History of Present Illness  Here for routine f/u and physical under new insurance.    NIDDM- decreased glipizide to 2.5 mg bid and hypoglycemia has resolved. Apparently insurance did not approve Mounjaro. She is still taking her Januvia, metformin. Has comorbid DPN. Currently on gabapentin    HTN- taking her enalapril as rx'd. BP up today. Goes up with regular NSAID use.    COPD- has maintained smoking cessation, not taking her inhalers as rx'd.    Chronic obesity- recent weight gain. Waiting for approval of Mounjaro    HARRIET- followed by sleep medicine. Using CPAP as rx'd. F/u later this month. Feels tx has helped her chronic fatigue.    Eye exam in 2024.  Needs flu shot    Followed by GYN for DUB, took iron replacement for iron def anemia.    Now working at [employer].    Requesting abx for \"bad tooth\" until she can get in with her dentist.    Reproductive History  A0  Sexual Activity: sexually active, heterosexual   Contraception: s/p tubal  Menses: s/p ablation  STD hx: HPV, genital warts. Denies concern regarding recent exposure, declines screening.  H/O abnormal pap: yes, ASCUS with HPV, h/o cervical and vulvar dysplasia  Cervical procedures: colposcopy with biopsy. Followed closely by GYN.     Social Hx  Household members: self, son  Marital status:   Tobacco use: former, 30 pk year hx  EtOH use: no  Illicit drug use: no, h/o substance use DO  Working at local factory.     Lifestyle  Diet: excess calorie, no diabetic appropriate  Exercise: no formal  Using seatbelt: yes  Mental Health: stable, denies worsening depression, no SI  Feels safe in home: yes     Screenings  Last pap: 2024  Last mammogram: 2024  Colonoscopy: n/a  DEXA: n/a  FLP: <1 year  BG/A1c: < 1 year  Vitamin D: unsure, h/o deficiency  Last dental check up: 2024, usually goes once yearly  Last vision exam: 2024, mild retinopathy  Immunizations UTD: no  Needs: " seasonal flu vaccine    Pt's previous HPI reviewed and updated as indicated.       The following portions of the patient's history were reviewed and updated as appropriate: allergies, current medications, past family history, past medical history, past social history, past surgical history, and problem list.    Review of Systems   Constitutional:  Positive for fatigue. Negative for chills, fever and unexpected weight change.   HENT:  Positive for congestion, dental problem and postnasal drip. Negative for ear pain, mouth sores, nosebleeds, rhinorrhea, sinus pain, sore throat and trouble swallowing.    Eyes:  Negative for pain, discharge and visual disturbance.   Respiratory:  Positive for shortness of breath (mild with exertion) and wheezing (intermittent). Negative for cough and chest tightness.    Cardiovascular:  Positive for leg swelling (stable). Negative for chest pain and palpitations.   Gastrointestinal:  Positive for diarrhea (mild, chronic, intermittent). Negative for abdominal pain, blood in stool, constipation, nausea and vomiting.   Endocrine: Positive for heat intolerance. Negative for polydipsia and polyuria.   Genitourinary:  Negative for dysuria and hematuria.   Musculoskeletal:  Positive for arthralgias, back pain, myalgias, neck pain and neck stiffness. Negative for joint swelling.   Skin:  Positive for rash (chronic). Negative for wound.   Neurological:  Positive for numbness and headaches. Negative for dizziness, tremors, syncope and weakness.   Hematological:  Negative for adenopathy. Does not bruise/bleed easily.   Psychiatric/Behavioral:  Positive for dysphoric mood and sleep disturbance. Negative for confusion and suicidal ideas. The patient is nervous/anxious.    Pt's previous ROS reviewed and updated as indicated.       Objective   Vitals:    01/06/25 1520   BP: 152/94   Pulse: 78   Temp: 98 °F (36.7 °C)   SpO2: 95%     Body mass index is 44.63 kg/m².      01/06/25  1520   Weight: 111 kg  (244 lb)       Physical Exam  Vitals and nursing note reviewed.   Constitutional:       Appearance: She is well-groomed. She is obese. She is not ill-appearing.   HENT:      Head: Atraumatic.      Mouth/Throat:      Mouth: Mucous membranes are moist. No oral lesions.   Eyes:      General: No scleral icterus.     Conjunctiva/sclera: Conjunctivae normal.   Neck:      Thyroid: No thyroid mass.   Cardiovascular:      Rate and Rhythm: Normal rate and regular rhythm.      Pulses: Normal pulses.      Heart sounds: Normal heart sounds.   Pulmonary:      Effort: Pulmonary effort is normal. No respiratory distress.      Breath sounds: Decreased breath sounds (mildly) present. No wheezing, rhonchi or rales.   Abdominal:      Palpations: Abdomen is soft.      Tenderness: There is no abdominal tenderness.   Musculoskeletal:      Lumbar back: Spasms, tenderness (diffuse soft tissue) and bony tenderness (L3-S1) present. Decreased range of motion (mildly). Negative right straight leg raise test and negative left straight leg raise test.      Right lower leg: Edema (mild) present.      Left lower leg: Edema (mild) present.   Lymphadenopathy:      Cervical: No cervical adenopathy.   Skin:     General: Skin is warm and dry.      Coloration: Skin is not jaundiced or pale.      Findings: Rash (scaling patches) present. No bruising.   Neurological:      Mental Status: She is alert and oriented to person, place, and time.      Gait: Gait is intact.   Psychiatric:         Mood and Affect: Mood and affect normal.         Behavior: Behavior normal. Behavior is cooperative.         Cognition and Memory: Cognition normal.     Pt's previous physical exam reviewed and updated as indicated.    Lab Results   Component Value Date    HGBA1C 7.7 (A) 01/06/2025    HGBA1C 6.8 (A) 09/05/2024    HGBA1C 7.1 (A) 02/19/2024     Lab Results   Component Value Date    MICROALBUR 41.2 09/05/2024    CREATININE 0.62 09/05/2024     Lab Results   Component Value  Date    WBC 9.34 09/05/2024    HGB 11.8 (L) 09/05/2024    HCT 36.6 09/05/2024    MCV 87.1 09/05/2024     09/05/2024       Lab Results   Component Value Date    GLUCOSE 100 (H) 09/05/2024    BUN 9 09/05/2024    CREATININE 0.62 09/05/2024    EGFRIFNONA 112 09/29/2021    EGFRIFAFRI 129 09/29/2021    BCR 14.5 09/05/2024    K 4.1 09/05/2024    CO2 27.0 09/05/2024    CALCIUM 8.8 09/05/2024    PROTENTOTREF 6.6 09/05/2024    ALBUMIN 4.0 09/05/2024    LABIL2 1.5 09/05/2024    AST 16 09/05/2024    ALT 12 09/05/2024       Lab Results   Component Value Date    CHLPL 135 09/05/2024    TRIG 145 09/05/2024    HDL 43 09/05/2024    LDL 67 09/05/2024       Lab Results   Component Value Date    TSH 0.967 09/05/2024           Assessment & Plan   Diagnoses and all orders for this visit:    1. Well adult exam (Primary)    2. Type 2 diabetes mellitus with other specified complication, without long-term current use of insulin  -     POC Glycosylated Hemoglobin (Hb A1C)    3. Essential hypertension    4. Chronic obstructive pulmonary disease, unspecified COPD type    5. Morbid obesity    6. Type 2 diabetes mellitus with hyperglycemia, without long-term current use of insulin  -     glipizide 2.5 MG tablet; Take 2.5 mg by mouth 2 (Two) Times a Day Before Meals.  Dispense: 60 tablet; Refill: 5  -     Tirzepatide 2.5 MG/0.5ML solution auto-injector; Inject 2.5 mg under the skin into the appropriate area as directed 1 (One) Time Per Week. CONTACT PCP FOR REFILL DOSE  Dispense: 2 mL; Refill: 1    7. Abscessed tooth  -     amoxicillin-clavulanate (AUGMENTIN) 875-125 MG per tablet; Take 1 tablet by mouth Every 12 (Twelve) Hours for 7 days.  Dispense: 14 tablet; Refill: 0    8. Flu vaccine need  -     Fluzone >6mos ([id number])    9. Obstructive sleep apnea on CPAP       Age appropriate preventive care reviewed including cancer screenings, safety measures, mental health concerns, supplements, prevention of CV disease and worsening DM, etc.  Handout provided.    I will look into coverage of her Mounjaro under her new insurance plan. Continue ace-inh, metformin, glipizide, januvia (d/c januvia when GLP med started). Patient encouraged to take meds as rx'd, follow diabetic appropriate diet, exercise daily, perform feet check daily, and have yearly diabetic eye exams.    Encouraged regular f/u with dentist.    Encouraged CPAP compliance.    Nutrition and activity goals reviewed including: mainly water to drink, limit white flour/processed sugar, higher lean protein, high fiber carbs, regular meals, working toward 150 mins cardio per week.    COPD stable.    HTN - continue enalapril    Continue Lyrica for PHN.  As part of patient's treatment plan I am prescribing a controlled substance.  The patient has been made aware of the appropriate use of such medications, including potential risk of somnolence, limited ability to drive and/or work safely, and potential for dependence and/or overdose.  It has also been made clear that these medications are for use by this patient only, without concomitant use of alcohol or other substances, unless prescribed.  History and physical exam exhibit continued safe and appropriate use of controlled substance.  JESSIE reviewed.    Routine f/u in 3 months, sooner as needed.  Patient was encouraged to keep me informed of any acute changes, lack of improvement, or any new concerning symptoms.  Pt is aware of reasons to seek emergent care.  Patient voiced understanding of all instructions and denied further questions.    Please note that portions of this note may have been completed with a voice recognition program.

## 2025-01-07 ENCOUNTER — PRIOR AUTHORIZATION (OUTPATIENT)
Dept: FAMILY MEDICINE CLINIC | Facility: CLINIC | Age: 45
End: 2025-01-07
Payer: COMMERCIAL

## 2025-01-07 DIAGNOSIS — E11.65 TYPE 2 DIABETES MELLITUS WITH HYPERGLYCEMIA, WITHOUT LONG-TERM CURRENT USE OF INSULIN: Primary | ICD-10-CM

## 2025-01-07 DIAGNOSIS — E11.65 UNCONTROLLED TYPE 2 DIABETES MELLITUS WITH HYPERGLYCEMIA: ICD-10-CM

## 2025-01-07 NOTE — TELEPHONE ENCOUNTER
Prior Authorization has been started on Cover My Meds for Mounjaro     Waiting on response from insurance       Key:[id number]

## 2025-01-08 NOTE — TELEPHONE ENCOUNTER
PA denied due to the following:  Our guideline named GLP-1 RECEPTOR AGONISTS requires the following rule(s) be met for  approval:   A. The member has a diagnosis of type 2 diabetes [a disorder with high blood sugar]   supported by:   1. The submission of chart or progress notes confirming the ICD-10 within the last 12 months   OR   2. The submission of an HbA1c [a type of diagnostic test] lab value greater than or equal to   6.5 percent within the last 12 months.  B. The member has had at least a 3-month trial and failure [drug did not work], allergy,   contraindication [harmful for] (including potential drug-drug interactions with other   medications) or intolerance [side effect] to two preferred GLP-1 agonists [drug in the same   group]: Byetta, Ozempic, Trulicity, Victoza supported by:   1. Claims history confirmation OR   2. Chart notes confirming the preferred failures.        WOULD YOU LIKE ME TO APPEAL THIS?

## 2025-01-10 DIAGNOSIS — F41.1 GENERALIZED ANXIETY DISORDER: ICD-10-CM

## 2025-01-10 RX ORDER — BUSPIRONE HYDROCHLORIDE 15 MG/1
30 TABLET ORAL 2 TIMES DAILY
Qty: 120 TABLET | Refills: 2 | Status: SHIPPED | OUTPATIENT
Start: 2025-01-10

## 2025-01-13 RX ORDER — SEMAGLUTIDE 0.68 MG/ML
0.25 INJECTION, SOLUTION SUBCUTANEOUS WEEKLY
Qty: 3 ML | Refills: 0 | Status: SHIPPED | OUTPATIENT
Start: 2025-01-13

## 2025-01-13 NOTE — TELEPHONE ENCOUNTER
Prior Authorization has been started on Cover My Meds for Ozempic    Waiting on response from insurance     Key:[id number]

## 2025-01-14 DIAGNOSIS — E61.1 IRON DEFICIENCY: ICD-10-CM

## 2025-01-14 RX ORDER — FERROUS GLUCONATE 324(38)MG
324 TABLET ORAL 2 TIMES DAILY WITH MEALS
Qty: 60 TABLET | Refills: 1 | Status: SHIPPED | OUTPATIENT
Start: 2025-01-14

## 2025-01-21 ENCOUNTER — TELEMEDICINE (OUTPATIENT)
Dept: PSYCHIATRY | Facility: CLINIC | Age: 45
End: 2025-01-21
Payer: COMMERCIAL

## 2025-01-21 DIAGNOSIS — F33.0 MILD EPISODE OF RECURRENT MAJOR DEPRESSIVE DISORDER: Primary | ICD-10-CM

## 2025-01-21 DIAGNOSIS — F51.5 NIGHTMARES: ICD-10-CM

## 2025-01-21 DIAGNOSIS — F43.10 POST TRAUMATIC STRESS DISORDER (PTSD): ICD-10-CM

## 2025-01-21 DIAGNOSIS — F41.1 GENERALIZED ANXIETY DISORDER: ICD-10-CM

## 2025-01-21 PROCEDURE — 99214 OFFICE O/P EST MOD 30 MIN: CPT | Performed by: NURSE PRACTITIONER

## 2025-01-21 NOTE — PROGRESS NOTES
Mode of Visit: Video   Location of patient: -HOME-   Location of provider: [facility]   You have chosen to receive care through a telehealth visit.   The patient has signed the video visit consent form.   The visit included audio and video interaction. No technical issues occurred during this visit.     Subjective   Clementine Johnson is a 44 y.o. female who presents today for follow up    Chief Complaint: Depression and anxiety    History of Present Illness:   History of Present Illness  Clementine Johnson presents for medication management follow-up via [employer] video visit.  Her last follow-up visit was 9/17/2024. Currently taking Prozac 40 mg daily, buspirone 15 mg 4 times daily and prazosin 2 mg nightly. Voices that her mood has been good and feels that medications have Her overall mood stable.  Anxiety and depression have been well controlled.  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  Sleep has improved, does still have nightmares on occasion.  Has CPAP that she is currently struggling to obtain a good seal preventing device from working appropriately.  Appetite has been decreased due to Ozempic and reports decrease in A1C. Denies SI/HI. PHQ-9 total score: 0, DARRIUS-7 total score: 0.    Previous Psych Meds: Zoloft, Paxil, Xanax, hydroxyzine, trazodone, propranolol currently taking Prozac (x 5 years), buspirone (x 5 years) and prazosin     The following portions of the patient's history were reviewed and updated as appropriate: allergies, current medications, past family history, past medical history, past social history, past surgical history and problem list.    Past Medical History:   Diagnosis Date    Anxiety     Arthritis     DDD (degenerative disc disease), cervical     DDD (degenerative disc disease), lumbar     Depression     Diabetes mellitus     Ear piercing     Gestational  "diabetes     HPV (human papilloma virus) infection     Lower back pain     PMS (premenstrual syndrome)     Pregnancy     A0 s/p CS x 1    Seasonal allergies     Substance abuse     Toxemia in pregnancy     Trauma     Varicella     Wears contact lenses     Wears glasses      Social History     Socioeconomic History    Marital status:    Tobacco Use    Smoking status: Former     Current packs/day: 1.00     Average packs/day: 1 pack/day for 30.1 years (30.1 ttl pk-yrs)     Types: Cigarettes     Start date: 1995     Passive exposure: Current    Smokeless tobacco: Never   Vaping Use    Vaping status: Never Used   Substance and Sexual Activity    Alcohol use: No    Drug use: Not Currently     Types: Benzodiazepines, Hydrocodone, Marijuana, Oxycodone     Comment: last use for any pills has been \"years\".  Last marijuana use was 5-6 months ago (assessed 10/17/22)    Sexual activity: Defer     Family History   Problem Relation Age of Onset    Arthritis Mother     Arthritis Father     Arthritis Maternal Aunt     Stroke Paternal Uncle     Arthritis Paternal Grandmother     Stroke Maternal Grandmother      Past Surgical History:   Procedure Laterality Date     SECTION      x1    COLPOSCOPY N/A 10/31/2022    Procedure: COLPOSCOPY OF THE VULVA, WIDE LOCAL EXCISION OF VULVAR LESIONS TIMES FIVE;  Surgeon: Nelson Copeland MD;  Location: [facility];  Service: Obstetrics/Gynecology;  Laterality: N/A;    TUBAL COAGULATION LAPAROSCOPIC N/A 2023    Procedure: LAPAROSCOPIC TUBAL LIGATION, DRAINAGE OF LEFT OVARIAN CYST, HYSTEROSCOPY, DILATION AND CURETTAGE, ENDOMETRIAL ABLATION WITH NOVASURE;  Surgeon: Nelson Copeland MD;  Location: [facility];  Service: Obstetrics/Gynecology;  Laterality: N/A;    VULVA BIOPSY N/A 3/3/2023    Procedure: Colposcopy of vulva, Wide local excision of vulvar lesion;  Surgeon: Nelson Copeland MD;  Location: [facility];  Service: Obstetrics/Gynecology;  Laterality: N/A;    " WISDOM TOOTH EXTRACTION       Patient Active Problem List   Diagnosis    Depression with anxiety    Meralgia paresthetica of left side    Vitamin D deficiency    Type 2 diabetes mellitus with hyperglycemia, without long-term current use of insulin    Chronic neck pain    Cervical radiculopathy    Seasonal allergic rhinitis due to pollen    Bronchospasm    Essential hypertension    Situational anxiety    DDD (degenerative disc disease), cervical    DDD (degenerative disc disease), lumbar    Bilateral carpal tunnel syndrome    Chronic contact dermatitis    Abnormal uterine bleeding (AUB)    Genital warts    Vulvar intraepithelial neoplasia (FAN) grade 3    Morbid obesity    Posttraumatic torticollis    Dependent edema    S/P tubal ligation    S/P endometrial ablation with Novasure    Chronic pain syndrome    Plaque psoriasis    B12 deficiency    Chronic obstructive pulmonary disease    Obstructive sleep apnea on CPAP    Diabetic retinopathy associated with type 2 diabetes mellitus     Allergies   Allergen Reactions    Nsaids Rash and Swelling     Swelling in the legs/feet    Codeine Rash    Erythromycin Rash    Erythromycin Base Rash    Tramadol Rash      Current Outpatient Medications   Medication Sig Dispense Refill    busPIRone (BUSPAR) 15 MG tablet Take 2 tablets by mouth 2 (Two) Times a Day. 120 tablet 2    B-D UF III MINI PEN NEEDLES 31G X 5 MM misc FOR USE WITH INSULIN PENS. FOLLOW INSTRUCTIONS ON INSULIN PENS. 100 each 3    buprenorphine-naloxone (SUBOXONE) 8-2 MG per SL tablet PLACE 2 TABLET UNDER TONGUE ONCE A DAY      enalapril (VASOTEC) 10 MG tablet Take 1 tablet by mouth Daily. 30 tablet 11    famotidine (Pepcid) 40 MG tablet Take 1 tablet by mouth 2 (Two) Times a Day. 60 tablet 2    ferrous gluconate (FERGON) 324 MG tablet TAKE 1 TABLET BY MOUTH 2 (TWO) TIMES A DAY WITH MEALS. OR TAKE WITH VITAMIN C. 60 tablet 1    fluconazole (Diflucan) 150 MG tablet Take one tablet today, repeat in 3 days 2 tablet 0     FLUoxetine (PROzac) 40 MG capsule TAKE ONE CAPSULE BY MOUTH EVERY DAY 30 capsule 2    furosemide (LASIX) 20 MG tablet TAKE 1 TABLET BY MOUTH DAILY AS NEEDED FOR SWELLING 30 tablet 0    glipizide 2.5 MG tablet Take 2.5 mg by mouth 2 (Two) Times a Day Before Meals. 60 tablet 5    glucose blood (ONE TOUCH ULTRA TEST) test strip Check blood sugar twice a day. 100 each 2    glucose monitor monitoring kit 1 each 2 (Two) Times a Day 1 each 0    hydrOXYzine (ATARAX) 25 MG tablet Take 1 tablet by mouth Every 6 (Six) Hours As Needed.  0    metFORMIN ER (GLUCOPHAGE-XR) 500 MG 24 hr tablet TAKE 2 TABLETS BY MOUTH DAILY WITH BREAKFAST. 60 tablet 11    Misc. Devices (Pulse Oximeter) misc Use 1 each Take As Directed. 1 each 0    ONETOUCH DELICA LANCETS 33G misc Inject 1 each under the skin into the appropriate area as directed 2 (Two) Times a Day. 100 each 11    prazosin (MINIPRESS) 2 MG capsule TAKE ONE CAPSULE BY MOUTH AT BEDTIME 30 capsule 2    pregabalin (Lyrica) 200 MG capsule Take 1 capsule by mouth 3 (Three) Times a Day. 90 capsule 2    Semaglutide,0.25 or 0.5MG/DOS, (Ozempic, 0.25 or 0.5 MG/DOSE,) 2 MG/3ML solution pen-injector Inject 0.25 mg under the skin into the appropriate area as directed 1 (One) Time Per Week. Continue for 4 weeks. Then increase to 0.5 mg weekly. Contact PCP for refill dose 3 mL 0    vitamin D (ERGOCALCIFEROL) 1.25 MG (81990 UT) capsule capsule TAKE 1 CAPSULE BY MOUTH EVERY 7 (SEVEN) DAYS. 4 capsule 2     No current facility-administered medications for this visit.     Review of Systems   Constitutional:  Negative for activity change, appetite change, unexpected weight gain and unexpected weight loss.   Respiratory:  Negative for shortness of breath.    Cardiovascular:  Negative for chest pain.   Musculoskeletal:  Positive for neck pain and neck stiffness.   Psychiatric/Behavioral:  Positive for sleep disturbance, depressed mood and stress. Negative for suicidal ideas. The patient is  nervous/anxious.      Physical Exam  Constitutional:       General: She is not in acute distress.     Appearance: Normal appearance.   Neurological:      Mental Status: She is alert.     Vitals:   The patient was seen remotely today via a MyChart Video Visit through [facility]. Unable to obtain vital signs due to nature of remote visit.    Mental Status Exam:   Hygiene:    Appears good  Cooperation:  Cooperative  Eye Contact:   ANA r/t video visit  Psychomotor Behavior:  Appropriate  Affect:  Full range and Appropriate  Mood: depressed and anxious  Hopelessness: Denies  Speech:  Normal  Thought Process:  Goal directed and Linear  Thought Content:  Mood congruent  Suicidal:  None  Homicidal:  None  Hallucinations:  None  Delusion:  None  Memory:  Intact  Orientation:  Person, Place, Time, and Situation  Reliability:  good  Insight:  Good  Judgement:  Good  Impulse Control:  Good    Assessment & Plan   Problems Addressed this Visit    None  Visit Diagnoses       Mild episode of recurrent major depressive disorder    -  Primary    Generalized anxiety disorder        Post traumatic stress disorder (PTSD)        Nightmares              Diagnoses         Codes Comments    Mild episode of recurrent major depressive disorder    -  Primary ICD-10-CM: F33.0  ICD-9-CM: 296.31     Generalized anxiety disorder     ICD-10-CM: F41.1  ICD-9-CM: 300.02     Post traumatic stress disorder (PTSD)     ICD-10-CM: F43.10  ICD-9-CM: 309.81     Nightmares     ICD-10-CM: F51.5  ICD-9-CM: 307.47             Visit Diagnoses:    ICD-10-CM ICD-9-CM   1. Mild episode of recurrent major depressive disorder  F33.0 296.31   2. Generalized anxiety disorder  F41.1 300.02   3. Post traumatic stress disorder (PTSD)  F43.10 309.81   4. Nightmares  F51.5 307.47     Clementine presents for medication management follow-up. Reports overall significant improvement in mood and is doing well with current medication regimen. Says that she would like to continue with  medications as previously prescribed as anxiety and depression are adequately controlled. Will continue with Prozac 40 mg daily, buspirone 15 mg 4 times daily and prazosin 2 mg nightly. Stopped trazodone and propranolol as she is awaiting upcoming sleep study. Recently submitted sleep oximetry study and is awaiting results.    -Continue Prozac 40 mg daily for anxiety and depression  -Continue buspirone 15 mg 4 times daily for anxiety  -Continue prazosin 2 mg nightly for nightmares    -Reviewed previous available documentation and most recent available labs. JESSIE unable to view per PDMP.       GOALS:  Short Term Goals: Patient will be compliant with medication, and patient will have no significant medication related side effects.  Patient will be engaged in psychotherapy as indicated.  Patient will report subjective improvement of symptoms.  Long term goals: To stabilize mood and treat/improve subjective symptoms, the patient will stay out of the hospital, the patient will be at an optimal level of functioning, and the patient will take all medications as prescribed.  The patient/guardian verbalized understanding and agreement with goals that were mutually set.    TREATMENT PLAN: Continue supportive psychotherapy efforts and medications as indicated for patient's diagnosis.  Pharmacological and Non-Pharmacological treatment options discussed during today's visit. Patient/Guardian acknowledged and verbally consented with current treatment plan and was educated on the importance of compliance with treatment and follow-up appointments.      MEDICATION ISSUES:  Discussed medication options and treatment plan of prescribed medication as well as the risks, benefits, any black box warnings, and side effects including potential falls, possible impaired driving, and metabolic adversities among others. Patient is agreeable to call the office with any worsening of symptoms or onset of side effects, or if any concerns or  questions arise.  The contact information for the office is made available to the patient. Patient is agreeable to call 911 or go to the nearest ER should they begin having any SI/HI, or if any urgent concerns arise. No medication side effects or related complaints today.     MEDS ORDERED DURING VISIT:  No orders of the defined types were placed in this encounter.      FOLLOW UP:  Return in about 3 months (around 4/21/2025).             This document has been electronically signed by SHANELLE Galvez  February 10, 2025 02:55 EST    Please note that portions of this note were completed with a voice recognition program. Efforts were made to edit dictation, but occasionally words are mistranscribed.

## 2025-01-24 ENCOUNTER — TELEPHONE (OUTPATIENT)
Dept: FAMILY MEDICINE CLINIC | Facility: CLINIC | Age: 45
End: 2025-01-24

## 2025-01-24 NOTE — TELEPHONE ENCOUNTER
Caller: Clementine Johnson    Relationship: Self    Best call back number: [phone]     What medication are you requesting: DIFLUCAN TABLET    What are your current symptoms: YEAST INFECTION    How long have you been experiencing symptoms: 3 TO 4 DAYS    Have you had these symptoms before:    [x] Yes  [] No    Have you been treated for these symptoms before:   [x] Yes  [] No    If a prescription is needed, what is your preferred pharmacy and phone number: [facility] [place] [address] - [phone]  - [phone] FX     Additional notes:PT STATED SHE HAS A YEAST INFECTION FROM RECENT ANTIBIOTIC. DR SULLVIAN USUALLY SENDS THIS IN FOR PT.

## 2025-01-27 RX ORDER — FLUCONAZOLE 150 MG/1
TABLET ORAL
Qty: 2 TABLET | Refills: 0 | Status: SHIPPED | OUTPATIENT
Start: 2025-01-27

## 2025-02-20 ENCOUNTER — OFFICE VISIT (OUTPATIENT)
Dept: FAMILY MEDICINE CLINIC | Facility: CLINIC | Age: 45
End: 2025-02-20
Payer: COMMERCIAL

## 2025-02-20 VITALS
OXYGEN SATURATION: 96 % | HEART RATE: 102 BPM | SYSTOLIC BLOOD PRESSURE: 118 MMHG | TEMPERATURE: 98.3 F | DIASTOLIC BLOOD PRESSURE: 80 MMHG | WEIGHT: 230 LBS | HEIGHT: 62 IN | BODY MASS INDEX: 42.33 KG/M2

## 2025-02-20 DIAGNOSIS — U07.1 COVID-19 VIRUS INFECTION: ICD-10-CM

## 2025-02-20 DIAGNOSIS — K04.7 DENTAL INFECTION: ICD-10-CM

## 2025-02-20 DIAGNOSIS — R05.1 ACUTE COUGH: ICD-10-CM

## 2025-02-20 DIAGNOSIS — E11.65 TYPE 2 DIABETES MELLITUS WITH HYPERGLYCEMIA, WITHOUT LONG-TERM CURRENT USE OF INSULIN: ICD-10-CM

## 2025-02-20 DIAGNOSIS — U07.1 COVID-19 VIRUS INFECTION: Primary | ICD-10-CM

## 2025-02-20 DIAGNOSIS — J44.9 CHRONIC OBSTRUCTIVE PULMONARY DISEASE, UNSPECIFIED COPD TYPE: ICD-10-CM

## 2025-02-20 LAB
EXPIRATION DATE: ABNORMAL
FLUAV AG UPPER RESP QL IA.RAPID: NOT DETECTED
FLUBV AG UPPER RESP QL IA.RAPID: NOT DETECTED
INTERNAL CONTROL: ABNORMAL
Lab: ABNORMAL
SARS-COV-2 AG UPPER RESP QL IA.RAPID: DETECTED

## 2025-02-20 PROCEDURE — 87428 SARSCOV & INF VIR A&B AG IA: CPT | Performed by: FAMILY MEDICINE

## 2025-02-20 PROCEDURE — 96372 THER/PROPH/DIAG INJ SC/IM: CPT | Performed by: FAMILY MEDICINE

## 2025-02-20 PROCEDURE — 99214 OFFICE O/P EST MOD 30 MIN: CPT | Performed by: FAMILY MEDICINE

## 2025-02-20 RX ORDER — DEXAMETHASONE SODIUM PHOSPHATE 10 MG/ML
8 INJECTION INTRAMUSCULAR; INTRAVENOUS ONCE
Status: COMPLETED | OUTPATIENT
Start: 2025-02-20 | End: 2025-02-20

## 2025-02-20 RX ORDER — NIRMATRELVIR AND RITONAVIR 300-100 MG
3 KIT ORAL 2 TIMES DAILY
Qty: 30 TABLET | Refills: 0 | OUTPATIENT
Start: 2025-02-20

## 2025-02-20 RX ORDER — SEMAGLUTIDE 1.34 MG/ML
1 INJECTION, SOLUTION SUBCUTANEOUS WEEKLY
Qty: 3 ML | Refills: 5 | Status: SHIPPED | OUTPATIENT
Start: 2025-02-20

## 2025-02-20 RX ORDER — CEFTRIAXONE 1 G/1
1 INJECTION, POWDER, FOR SOLUTION INTRAMUSCULAR; INTRAVENOUS ONCE
Status: COMPLETED | OUTPATIENT
Start: 2025-02-20 | End: 2025-02-20

## 2025-02-20 RX ADMIN — DEXAMETHASONE SODIUM PHOSPHATE 8 MG: 10 INJECTION INTRAMUSCULAR; INTRAVENOUS at 11:06

## 2025-02-20 RX ADMIN — CEFTRIAXONE 1 G: 1 INJECTION, POWDER, FOR SOLUTION INTRAMUSCULAR; INTRAVENOUS at 11:05

## 2025-02-20 NOTE — PROGRESS NOTES
Subjective   Clementine Johnson is a 44 y.o. female.     History of Present Illness  She c/o cough, fever  Cough  This is a new problem. The current episode started in the past 7 days. The problem has been worse. The problem occurs every few minutes. The cough is Dry. Associated symptoms include chills, ear pain, a fever, headaches, myalgias, nasal congestion, postnasal drip, a rash (chronic), a sore throat, shortness of breath (mild) and wheezing (intermittent, chronic). Pertinent negatives include no chest pain, hemoptysis or rhinorrhea. The symptoms are aggravated by lying down and cold air. Risk factors for lung disease include smoking/tobacco exposure. She has tried a beta-agonist inhaler and rest for the symptoms. The treatment provided no relief. Her past medical history is significant for COPD.     Also under care of dentist/oral surgeon. Recently dx'd with chronic dental infection. Scheduled for dental extraction next week. Having lymph node pain and swelling in neck, around ear.     Would like to increase her ozempic dose for DM if possible. Denies side effects. BG improving. Weight decreasing.    The following portions of the patient's history were reviewed and updated as appropriate: allergies, current medications, past family history, past medical history, past social history, past surgical history, and problem list.    Review of Systems   Constitutional:  Positive for chills, fatigue and fever. Negative for unexpected weight change.   HENT:  Positive for congestion, dental problem, ear pain, postnasal drip and sore throat. Negative for mouth sores, nosebleeds, rhinorrhea, sinus pain and trouble swallowing.    Eyes:  Negative for pain, discharge and visual disturbance.   Respiratory:  Positive for cough, shortness of breath (mild) and wheezing (intermittent, chronic). Negative for hemoptysis and chest tightness.    Cardiovascular:  Positive for leg swelling (stable). Negative for chest pain and  palpitations.   Gastrointestinal:  Positive for diarrhea (mild, chronic, intermittent). Negative for abdominal pain, blood in stool, constipation, nausea and vomiting.   Endocrine: Positive for heat intolerance. Negative for polydipsia and polyuria.   Genitourinary:  Negative for dysuria and hematuria.   Musculoskeletal:  Positive for arthralgias, back pain, myalgias, neck pain and neck stiffness. Negative for joint swelling.   Skin:  Positive for rash (chronic). Negative for wound.   Neurological:  Positive for numbness and headaches. Negative for dizziness, tremors, syncope and weakness (generalized).   Hematological:  Positive for adenopathy. Does not bruise/bleed easily.   Psychiatric/Behavioral:  Positive for dysphoric mood and sleep disturbance. Negative for confusion and suicidal ideas. The patient is nervous/anxious.    Pt's previous ROS reviewed and updated as indicated.       Objective   Vitals:    02/20/25 0954   BP: 118/80   Pulse: 102   Temp: 98.3 °F (36.8 °C)   SpO2: 96%     Body mass index is 42.07 kg/m².      02/20/25  0954   Weight: 104 kg (230 lb)       Physical Exam  Vitals and nursing note reviewed.   Constitutional:       General: She is not in acute distress.     Appearance: She is obese. She is ill-appearing.   HENT:      Head: Atraumatic.      Right Ear: Tympanic membrane and ear canal normal.      Left Ear: Tympanic membrane and ear canal normal.      Nose: Congestion present. No rhinorrhea.      Mouth/Throat:      Mouth: Mucous membranes are moist. No oral lesions.      Dentition: Abnormal dentition. Gingival swelling and dental caries present.      Pharynx: Posterior oropharyngeal erythema present. No oropharyngeal exudate.   Eyes:      General: No scleral icterus.     Conjunctiva/sclera: Conjunctivae normal.   Neck:      Thyroid: No thyroid mass.   Cardiovascular:      Rate and Rhythm: Normal rate and regular rhythm.      Pulses: Normal pulses.      Heart sounds: Normal heart sounds.       Comments: HR decreased to 80s from time of triage  Pulmonary:      Effort: Pulmonary effort is normal. No respiratory distress.      Breath sounds: Decreased breath sounds (mildly) present. No wheezing, rhonchi or rales.   Musculoskeletal:      Right lower leg: Edema (mild) present.      Left lower leg: Edema (mild) present.   Lymphadenopathy:      Head:      Right side of head: Submandibular and preauricular adenopathy present.      Left side of head: Submandibular and preauricular adenopathy present.      Cervical: Cervical adenopathy present.      Right cervical: Superficial cervical adenopathy present.      Left cervical: Superficial cervical adenopathy present.   Skin:     General: Skin is warm and dry.      Coloration: Skin is not cyanotic, jaundiced or pale.      Findings: Rash (scaling patches) present. No bruising.   Neurological:      Mental Status: She is alert and oriented to person, place, and time.      Gait: Gait is intact.   Psychiatric:         Mood and Affect: Affect normal. Mood is anxious.         Behavior: Behavior normal. Behavior is cooperative.         Cognition and Memory: Cognition normal.     Pt's previous physical exam reviewed and updated as indicated.    Recent Results (from the past 24 hours)   POCT SARS-CoV-2 + Flu Antigen MARGARITO    Collection Time: 02/20/25 10:03 AM    Specimen: Swab   Result Value Ref Range    SARS Antigen Detected (A) Not Detected, Presumptive Negative    Influenza A Antigen MARGARITO Not Detected Not Detected    Influenza B Antigen MARGARITO Not Detected Not Detected    Internal Control Passed Passed    Lot Number [id number]     Expiration Date 11,[id number]      Lab Results   Component Value Date    HGBA1C 7.7 (A) 01/06/2025    HGBA1C 6.8 (A) 09/05/2024    HGBA1C 7.1 (A) 02/19/2024     Lab Results   Component Value Date    MICROALBUR 41.2 09/05/2024    CREATININE 0.62 09/05/2024     Lab Results   Component Value Date    WBC 9.34 09/05/2024    HGB 11.8 (L) 09/05/2024    HCT 36.6  09/05/2024    MCV 87.1 09/05/2024     09/05/2024       Lab Results   Component Value Date    GLUCOSE 100 (H) 09/05/2024    BUN 9 09/05/2024    CREATININE 0.62 09/05/2024    EGFRIFNONA 112 09/29/2021    EGFRIFAFRI 129 09/29/2021    BCR 14.5 09/05/2024    K 4.1 09/05/2024    CO2 27.0 09/05/2024    CALCIUM 8.8 09/05/2024    ALBUMIN 4.0 09/05/2024    AST 16 09/05/2024    ALT 12 09/05/2024       Lab Results   Component Value Date    CHLPL 135 09/05/2024    TRIG 145 09/05/2024    HDL 43 09/05/2024    LDL 67 09/05/2024     Lab Results   Component Value Date    TSH 0.967 09/05/2024           Assessment & Plan   Diagnoses and all orders for this visit:    1. COVID-19 virus infection (Primary)  -     Nirmatrelvir & Ritonavir, 300mg/100mg, (PAXLOVID); Take 3 tablets by mouth 2 (Two) Times a Day.  Dispense: 30 tablet; Refill: 0  -     dexAMETHasone (DECADRON) injection 8 mg    2. Acute cough  -     POCT SARS-CoV-2 + Flu Antigen MARGARITO    3. Type 2 diabetes mellitus with hyperglycemia, without long-term current use of insulin  -     Semaglutide, 1 MG/DOSE, (Ozempic, 1 MG/DOSE,) 4 MG/3ML solution pen-injector; Inject 1 mg under the skin into the appropriate area as directed 1 (One) Time Per Week.  Dispense: 3 mL; Refill: 5    4. Dental infection  -     cefTRIAXone (ROCEPHIN) injection 1 g    5. Chronic obstructive pulmonary disease, unspecified COPD type       I have reviewed risks/benefits and potential side effects of various treatment options for COVID in high risk pt. Pt voices understanding and wishes to proceed with Paxlovid and symptom mgnt. Continue albuterol prn. Dex as above.    NIDDM not yet at goal. Up-titrate Ozempic as tolerated. Patient encouraged to take meds as rx'd, follow diabetic appropriate diet, exercise daily, perform feet check daily, and have yearly diabetic eye exams.    F/u with dentist/oral surgeon as scheduled. Inform them of recent COVID infection. Empiric abx as above.    F/u as scheduled, f/u  sooner as needed.  Patient was encouraged to keep me informed of any acute changes, lack of improvement, or any new concerning symptoms.  Pt is aware of reasons to seek emergent care.  Patient voiced understanding of all instructions and denied further questions.    Please note that portions of this note may have been completed with a voice recognition program.

## 2025-02-24 DIAGNOSIS — R80.9 MICROALBUMINURIA: ICD-10-CM

## 2025-02-25 DIAGNOSIS — E55.9 VITAMIN D DEFICIENCY: ICD-10-CM

## 2025-02-25 RX ORDER — ERGOCALCIFEROL 1.25 MG/1
1 CAPSULE ORAL
Qty: 4 CAPSULE | Refills: 2 | Status: SHIPPED | OUTPATIENT
Start: 2025-02-25

## 2025-02-25 RX ORDER — ENALAPRIL MALEATE 10 MG/1
10 TABLET ORAL DAILY
Qty: 30 TABLET | Refills: 11 | Status: SHIPPED | OUTPATIENT
Start: 2025-02-25

## 2025-02-26 ENCOUNTER — TELEPHONE (OUTPATIENT)
Dept: FAMILY MEDICINE CLINIC | Facility: CLINIC | Age: 45
End: 2025-02-26

## 2025-02-26 NOTE — TELEPHONE ENCOUNTER
At her visit we discussed her wellness incentive form only.    Now I also have FMLA from on my desk which we did not discuss. I will need to know:  1) what type of leave she is needing- intermittent or continuous for a certain period of time?  2) what date to start and stop leave

## 2025-02-26 NOTE — TELEPHONE ENCOUNTER
Caller: Clmeentine Johnson    Relationship: Self    Best call back number:   Telephone Information:   Mobile [phone]       What was the call regarding: PATIENT CALLED IN TO CHECK ON THE STATUS OF THE PAPER WORK SHE DROPPED ON ON 02/20/25. PLEASE CALL HER WHEN IT IS READY FOR .

## 2025-02-27 ENCOUNTER — TELEPHONE (OUTPATIENT)
Dept: FAMILY MEDICINE CLINIC | Facility: CLINIC | Age: 45
End: 2025-02-27
Payer: COMMERCIAL

## 2025-02-27 NOTE — TELEPHONE ENCOUNTER
Patient states she missed 3 days last week due to covid. Was tested here on 2/20  Pt needs paperwork faxed to number on the form, and needs copies for her to take to work. She was off on 2/19,2/20,2/21

## 2025-03-15 DIAGNOSIS — E61.1 IRON DEFICIENCY: ICD-10-CM

## 2025-03-17 RX ORDER — FERROUS GLUCONATE 324(38)MG
324 TABLET ORAL 2 TIMES DAILY WITH MEALS
Qty: 60 TABLET | Refills: 1 | Status: SHIPPED | OUTPATIENT
Start: 2025-03-17

## 2025-03-22 DIAGNOSIS — G56.03 BILATERAL CARPAL TUNNEL SYNDROME: ICD-10-CM

## 2025-03-22 DIAGNOSIS — M50.30 DDD (DEGENERATIVE DISC DISEASE), CERVICAL: ICD-10-CM

## 2025-03-22 DIAGNOSIS — M51.369 DDD (DEGENERATIVE DISC DISEASE), LUMBAR: ICD-10-CM

## 2025-03-22 DIAGNOSIS — M54.12 CERVICAL RADICULOPATHY: ICD-10-CM

## 2025-03-24 RX ORDER — PREGABALIN 200 MG/1
200 CAPSULE ORAL 3 TIMES DAILY
Qty: 90 CAPSULE | Refills: 1 | Status: SHIPPED | OUTPATIENT
Start: 2025-03-24

## 2025-03-24 NOTE — TELEPHONE ENCOUNTER
Rx Refill Note  Requested Prescriptions     Pending Prescriptions Disp Refills    pregabalin (Lyrica) 200 MG capsule 90 capsule 2     Sig: Take 1 capsule by mouth 3 (Three) Times a Day.      Last office visit with prescribing clinician: 2/20/2025   Last telemedicine visit with prescribing clinician: Visit date not found   Next office visit with prescribing clinician: 4/9/2025                         Would you like a call back once the refill request has been completed: [] Yes [] No    If the office needs to give you a call back, can they leave a voicemail: [] Yes [] No    Jamel Simons MA  03/24/25, 14:38 EDT

## 2025-03-27 DIAGNOSIS — F51.5 NIGHTMARES: ICD-10-CM

## 2025-03-27 DIAGNOSIS — F41.1 GENERALIZED ANXIETY DISORDER: ICD-10-CM

## 2025-03-27 DIAGNOSIS — F33.1 MODERATE EPISODE OF RECURRENT MAJOR DEPRESSIVE DISORDER: ICD-10-CM

## 2025-03-27 DIAGNOSIS — F43.10 POST TRAUMATIC STRESS DISORDER (PTSD): ICD-10-CM

## 2025-03-28 RX ORDER — PRAZOSIN HYDROCHLORIDE 2 MG/1
2 CAPSULE ORAL
Qty: 30 CAPSULE | Refills: 2 | Status: SHIPPED | OUTPATIENT
Start: 2025-03-28

## 2025-03-28 RX ORDER — FLUOXETINE HYDROCHLORIDE 40 MG/1
40 CAPSULE ORAL DAILY
Qty: 30 CAPSULE | Refills: 2 | Status: SHIPPED | OUTPATIENT
Start: 2025-03-28

## 2025-04-16 ENCOUNTER — OFFICE VISIT (OUTPATIENT)
Dept: FAMILY MEDICINE CLINIC | Facility: CLINIC | Age: 45
End: 2025-04-16
Payer: COMMERCIAL

## 2025-04-16 VITALS
HEIGHT: 62 IN | BODY MASS INDEX: 41.41 KG/M2 | DIASTOLIC BLOOD PRESSURE: 92 MMHG | OXYGEN SATURATION: 96 % | WEIGHT: 225 LBS | HEART RATE: 71 BPM | SYSTOLIC BLOOD PRESSURE: 138 MMHG

## 2025-04-16 DIAGNOSIS — I10 ESSENTIAL HYPERTENSION: ICD-10-CM

## 2025-04-16 DIAGNOSIS — Z72.0 TOBACCO ABUSE DISORDER: ICD-10-CM

## 2025-04-16 DIAGNOSIS — E66.01 MORBID OBESITY: ICD-10-CM

## 2025-04-16 DIAGNOSIS — G47.33 OBSTRUCTIVE SLEEP APNEA ON CPAP: ICD-10-CM

## 2025-04-16 DIAGNOSIS — E11.65 TYPE 2 DIABETES MELLITUS WITH HYPERGLYCEMIA, WITHOUT LONG-TERM CURRENT USE OF INSULIN: Primary | ICD-10-CM

## 2025-04-16 DIAGNOSIS — E53.8 B12 DEFICIENCY: ICD-10-CM

## 2025-04-16 DIAGNOSIS — E61.1 IRON DEFICIENCY: ICD-10-CM

## 2025-04-16 DIAGNOSIS — Z51.81 MEDICATION MONITORING ENCOUNTER: ICD-10-CM

## 2025-04-16 DIAGNOSIS — J44.9 CHRONIC OBSTRUCTIVE PULMONARY DISEASE, UNSPECIFIED COPD TYPE: ICD-10-CM

## 2025-04-16 DIAGNOSIS — E11.319 DIABETIC RETINOPATHY ASSOCIATED WITH TYPE 2 DIABETES MELLITUS, MACULAR EDEMA PRESENCE UNSPECIFIED, UNSPECIFIED LATERALITY, UNSPECIFIED RETINOPATHY SEVERITY: ICD-10-CM

## 2025-04-16 DIAGNOSIS — E55.9 VITAMIN D DEFICIENCY: ICD-10-CM

## 2025-04-16 RX ORDER — SEMAGLUTIDE 2.68 MG/ML
2 INJECTION, SOLUTION SUBCUTANEOUS WEEKLY
Qty: 3 ML | Refills: 5 | Status: SHIPPED | OUTPATIENT
Start: 2025-04-16

## 2025-04-16 NOTE — PROGRESS NOTES
Subjective   Clementine Johnson is a 45 y.o. female.     Here for routine follow-up on several chronic medical problems.    Diabetes  She presents for her follow-up diabetic visit. She has type 2 diabetes mellitus. No MedicAlert identification noted. The initial diagnosis of diabetes was made 5 years ago. Her disease course has been fluctuating. Hypoglycemia symptoms include headaches and nervousness/anxiousness. Pertinent negatives for hypoglycemia include no confusion, dizziness or tremors. Associated symptoms include fatigue, foot paresthesias, polydipsia and weight loss. Pertinent negatives for diabetes include no chest pain, no polyuria and no weakness. Symptoms are stable. She is compliant with treatment most of the time. She is following a high fat/cholesterol diet. She participates in exercise intermittently. She monitors blood glucose at home 1-2 x per week. Her highest blood glucose is 110-130 mg/dl. She does not see a podiatrist. Eye exam is current.     NIDDM- decreased glipizide to 2.5 mg bid and hypoglycemia has resolved. Taking her Ozempic, metformin. Has comorbid DPN. Currently on Lyrica.  No aberrant behavior.     HTN- taking her enalapril as rx'd. BP slightly up today. Goes up with regular NSAID use.     COPD- smoking again up to 1 ppd, not taking her inhalers as rx'd.     Chronic obesity-Down 20 pounds in past 3 months on Ozempic.    HARRIET- followed by sleep medicine. Using CPAP as rx'd. Feels tx has helped her chronic fatigue.     Followed by GYN for DUB, took iron replacement for iron def anemia.     Followed by neurology for cervical dystonia, receiving injections.    Substance use disorder currently under management at [facility].    Pt's previous HPI reviewed and updated as indicated.     The following portions of the patient's history were reviewed and updated as appropriate: allergies, current medications, past family history, past medical history, past social history, past surgical  history, and problem list.    Review of Systems   Constitutional:  Positive for fatigue and weight loss. Negative for chills, fever and unexpected weight change.   HENT:  Positive for congestion, dental problem and postnasal drip. Negative for ear pain, mouth sores, nosebleeds, rhinorrhea, sinus pain, sore throat and trouble swallowing.    Eyes:  Negative for pain, discharge and visual disturbance.   Respiratory:  Positive for shortness of breath (mild with exertion) and wheezing (intermittent). Negative for cough and chest tightness.    Cardiovascular:  Positive for leg swelling (stable). Negative for chest pain and palpitations.   Gastrointestinal:  Positive for diarrhea (mild, chronic, intermittent). Negative for abdominal pain, blood in stool, constipation, nausea and vomiting.   Endocrine: Positive for heat intolerance and polydipsia. Negative for polyuria.   Genitourinary:  Negative for dysuria and hematuria.   Musculoskeletal:  Positive for arthralgias, back pain, myalgias, neck pain and neck stiffness. Negative for joint swelling.   Skin:  Positive for rash (chronic). Negative for wound.   Neurological:  Positive for numbness and headaches. Negative for dizziness, tremors, syncope and weakness.   Hematological:  Negative for adenopathy. Does not bruise/bleed easily.   Psychiatric/Behavioral:  Positive for dysphoric mood and sleep disturbance. Negative for confusion and suicidal ideas. The patient is nervous/anxious.    Pt's previous ROS reviewed and updated as indicated.       Objective   Vitals:    04/16/25 1519   BP: 138/92   Pulse: 71   SpO2: 96%     Body mass index is 41.15 kg/m².      04/16/25  1519   Weight: 102 kg (225 lb)       Physical Exam  Vitals and nursing note reviewed.   Constitutional:       Appearance: She is well-groomed. She is obese. She is not ill-appearing.   HENT:      Head: Atraumatic.      Mouth/Throat:      Mouth: Mucous membranes are moist. No oral lesions.   Eyes:      General: No  scleral icterus.     Conjunctiva/sclera: Conjunctivae normal.   Neck:      Thyroid: No thyroid mass.   Cardiovascular:      Rate and Rhythm: Normal rate and regular rhythm.      Pulses: Normal pulses.      Heart sounds: Normal heart sounds.   Pulmonary:      Effort: Pulmonary effort is normal. No respiratory distress.      Breath sounds: Decreased breath sounds (mildly) present. No wheezing, rhonchi or rales.   Musculoskeletal:      Cervical back: Spasms present.      Lumbar back: Spasms, tenderness (diffuse soft tissue) and bony tenderness (L3-S1) present. Decreased range of motion (mildly). Negative right straight leg raise test and negative left straight leg raise test.      Right lower leg: Edema (mild) present.      Left lower leg: Edema (mild) present.   Lymphadenopathy:      Cervical: No cervical adenopathy.   Skin:     General: Skin is warm and dry.      Coloration: Skin is not jaundiced or pale.      Findings: Rash (scaling patches) present. No bruising.   Neurological:      Mental Status: She is alert and oriented to person, place, and time.      Gait: Gait is intact.   Psychiatric:         Mood and Affect: Mood and affect normal.         Behavior: Behavior normal. Behavior is cooperative.         Cognition and Memory: Cognition normal.     Pt's previous physical exam reviewed and updated as indicated.      Assessment & Plan   Diagnoses and all orders for this visit:    1. Type 2 diabetes mellitus with hyperglycemia, without long-term current use of insulin (Primary)  -     Ozempic, 2 MG/DOSE, 8 MG/3ML solution pen-injector; Inject 2 mg under the skin into the appropriate area as directed 1 (One) Time Per Week.  Dispense: 3 mL; Refill: 5  -     Comprehensive Metabolic Panel  -     Lipid Panel  -     Hemoglobin A1c    2. Essential hypertension  -     CBC & Differential  -     Comprehensive Metabolic Panel    3. Medication monitoring encounter  -     CBC & Differential  -     Comprehensive Metabolic  Panel    4. B12 deficiency  -     CBC & Differential  -     Vitamin B12    5. Vitamin D deficiency  -     Vitamin D,25-Hydroxy    6. Iron deficiency  -     Iron Profile  -     Ferritin    7. Morbid obesity    8. Diabetic retinopathy associated with type 2 diabetes mellitus, macular edema presence unspecified, unspecified laterality, unspecified retinopathy severity    9. Chronic obstructive pulmonary disease, unspecified COPD type    10. Obstructive sleep apnea on CPAP       NIDDM in setting of chronic morbid obesity-improving on Ozempic.  Uptitrate as tolerated.  Continue metformin, glipizide, ACE inhibitor.  She will consider statin therapy    Hypertension with fluctuating control based on use of NSAIDs, salt intake etc., continue enalapril. Pt advised to eat a heart healthy diet and get regular aerobic exercise.    Assess status of vit/min deficiencies and replace as indicated.    COPD stable.  Encouraged medication compliance. Tobacco cessation advised.  Pt voiced understanding of health risks of cont'd tobacco use.    Encouraged to continue CPAP compliance.  Good improvement in excessive daytime sleepiness with treatment    Routine follow-up in 3 months, sooner as needed/instructed  I will contact patient regarding test results and provide instructions regarding any necessary changes in plan of care.  Patient was encouraged to keep me informed of any acute changes, lack of improvement, or any new concerning symptoms.  Pt is aware of reasons to seek emergent care.  Patient voiced understanding of all instructions and denied further questions.    Please note that portions of this note may have been completed with a voice recognition program.

## 2025-04-17 DIAGNOSIS — F41.1 GENERALIZED ANXIETY DISORDER: ICD-10-CM

## 2025-04-17 LAB
25(OH)D3+25(OH)D2 SERPL-MCNC: 49.4 NG/ML (ref 30–100)
ALBUMIN SERPL-MCNC: 3.8 G/DL (ref 3.9–4.9)
ALP SERPL-CCNC: 83 IU/L (ref 44–121)
ALT SERPL-CCNC: 12 IU/L (ref 0–32)
AST SERPL-CCNC: 14 IU/L (ref 0–40)
BASOPHILS # BLD AUTO: 0 X10E3/UL (ref 0–0.2)
BASOPHILS NFR BLD AUTO: 1 %
BILIRUB SERPL-MCNC: 1.2 MG/DL (ref 0–1.2)
BUN SERPL-MCNC: 5 MG/DL (ref 6–24)
BUN/CREAT SERPL: 7 (ref 9–23)
CALCIUM SERPL-MCNC: 8.9 MG/DL (ref 8.7–10.2)
CHLORIDE SERPL-SCNC: 102 MMOL/L (ref 96–106)
CHOLEST SERPL-MCNC: 124 MG/DL (ref 100–199)
CO2 SERPL-SCNC: 25 MMOL/L (ref 20–29)
CREAT SERPL-MCNC: 0.69 MG/DL (ref 0.57–1)
EGFRCR SERPLBLD CKD-EPI 2021: 110 ML/MIN/1.73
EOSINOPHIL # BLD AUTO: 0.1 X10E3/UL (ref 0–0.4)
EOSINOPHIL NFR BLD AUTO: 1 %
ERYTHROCYTE [DISTWIDTH] IN BLOOD BY AUTOMATED COUNT: 13.3 % (ref 11.7–15.4)
FERRITIN SERPL-MCNC: 154 NG/ML (ref 15–150)
GLOBULIN SER CALC-MCNC: 2.5 G/DL (ref 1.5–4.5)
GLUCOSE SERPL-MCNC: 103 MG/DL (ref 70–99)
HBA1C MFR BLD: 6.5 % (ref 4.8–5.6)
HCT VFR BLD AUTO: 35.3 % (ref 34–46.6)
HDLC SERPL-MCNC: 46 MG/DL
HGB BLD-MCNC: 11.4 G/DL (ref 11.1–15.9)
IMM GRANULOCYTES # BLD AUTO: 0 X10E3/UL (ref 0–0.1)
IMM GRANULOCYTES NFR BLD AUTO: 0 %
IRON SATN MFR SERPL: 9 % (ref 15–55)
IRON SERPL-MCNC: 26 UG/DL (ref 27–159)
LDLC SERPL CALC-MCNC: 59 MG/DL (ref 0–99)
LYMPHOCYTES # BLD AUTO: 2.1 X10E3/UL (ref 0.7–3.1)
LYMPHOCYTES NFR BLD AUTO: 30 %
MCH RBC QN AUTO: 27 PG (ref 26.6–33)
MCHC RBC AUTO-ENTMCNC: 32.3 G/DL (ref 31.5–35.7)
MCV RBC AUTO: 84 FL (ref 79–97)
MONOCYTES # BLD AUTO: 0.5 X10E3/UL (ref 0.1–0.9)
MONOCYTES NFR BLD AUTO: 7 %
NEUTROPHILS # BLD AUTO: 4.3 X10E3/UL (ref 1.4–7)
NEUTROPHILS NFR BLD AUTO: 61 %
PLATELET # BLD AUTO: 260 X10E3/UL (ref 150–450)
POTASSIUM SERPL-SCNC: 3.6 MMOL/L (ref 3.5–5.2)
PROT SERPL-MCNC: 6.3 G/DL (ref 6–8.5)
RBC # BLD AUTO: 4.22 X10E6/UL (ref 3.77–5.28)
SODIUM SERPL-SCNC: 141 MMOL/L (ref 134–144)
TIBC SERPL-MCNC: 297 UG/DL (ref 250–450)
TRIGL SERPL-MCNC: 100 MG/DL (ref 0–149)
UIBC SERPL-MCNC: 271 UG/DL (ref 131–425)
VIT B12 SERPL-MCNC: 326 PG/ML (ref 232–1245)
VLDLC SERPL CALC-MCNC: 19 MG/DL (ref 5–40)
WBC # BLD AUTO: 7 X10E3/UL (ref 3.4–10.8)

## 2025-04-17 RX ORDER — BUSPIRONE HYDROCHLORIDE 15 MG/1
30 TABLET ORAL 2 TIMES DAILY
Qty: 120 TABLET | Refills: 2 | Status: SHIPPED | OUTPATIENT
Start: 2025-04-17

## 2025-04-21 ENCOUNTER — TELEMEDICINE (OUTPATIENT)
Dept: PSYCHIATRY | Facility: CLINIC | Age: 45
End: 2025-04-21
Payer: COMMERCIAL

## 2025-04-21 DIAGNOSIS — F33.0 MILD EPISODE OF RECURRENT MAJOR DEPRESSIVE DISORDER: ICD-10-CM

## 2025-04-21 DIAGNOSIS — F51.5 NIGHTMARES: ICD-10-CM

## 2025-04-21 DIAGNOSIS — F41.1 GENERALIZED ANXIETY DISORDER: Primary | ICD-10-CM

## 2025-04-21 DIAGNOSIS — F43.10 POST TRAUMATIC STRESS DISORDER (PTSD): ICD-10-CM

## 2025-04-21 PROCEDURE — 96127 BRIEF EMOTIONAL/BEHAV ASSMT: CPT | Performed by: NURSE PRACTITIONER

## 2025-04-21 PROCEDURE — 99214 OFFICE O/P EST MOD 30 MIN: CPT | Performed by: NURSE PRACTITIONER

## 2025-04-21 RX ORDER — PRAZOSIN HYDROCHLORIDE 1 MG/1
3 CAPSULE ORAL
Qty: 90 CAPSULE | Refills: 2 | Status: SHIPPED | OUTPATIENT
Start: 2025-04-21

## 2025-04-23 PROBLEM — Z72.0 TOBACCO ABUSE DISORDER: Status: ACTIVE | Noted: 2025-04-23

## 2025-04-23 PROBLEM — M47.816 ARTHROPATHY OF LUMBAR FACET JOINT: Status: ACTIVE | Noted: 2025-01-08

## 2025-04-23 PROBLEM — G24.3 ISOLATED CERVICAL DYSTONIA: Status: ACTIVE | Noted: 2025-01-08

## 2025-05-06 DIAGNOSIS — E61.1 IRON DEFICIENCY: ICD-10-CM

## 2025-05-06 RX ORDER — FERROUS GLUCONATE 324(38)MG
324 TABLET ORAL 2 TIMES DAILY WITH MEALS
Qty: 60 TABLET | Refills: 1 | Status: SHIPPED | OUTPATIENT
Start: 2025-05-06

## 2025-05-06 NOTE — TELEPHONE ENCOUNTER
Caller: Clementine Johnson    Relationship: Self    Best call back number: [phone]    Requested Prescriptions:   Requested Prescriptions     Pending Prescriptions Disp Refills    ferrous gluconate (FERGON) 324 MG tablet 60 tablet 1     Sig: Take 1 tablet by mouth 2 (Two) Times a Day With Meals. Or take with vitamin C.        Pharmacy where request should be sent: [facility] - [place] [address] - [phone] PH - [phone] FX     Last office visit with prescribing clinician: 4/16/2025   Last telemedicine visit with prescribing clinician: Visit date not found   Next office visit with prescribing clinician: 7/16/2025         Does the patient have less than a 3 day supply:   Yes        Jose Alejandro Rashid Rep   05/06/25 15:09 EDT

## 2025-05-14 ENCOUNTER — CLINICAL SUPPORT (OUTPATIENT)
Dept: FAMILY MEDICINE CLINIC | Facility: CLINIC | Age: 45
End: 2025-05-14
Payer: COMMERCIAL

## 2025-05-14 ENCOUNTER — TELEPHONE (OUTPATIENT)
Dept: FAMILY MEDICINE CLINIC | Facility: CLINIC | Age: 45
End: 2025-05-14

## 2025-05-14 DIAGNOSIS — E53.8 B12 DEFICIENCY: Primary | ICD-10-CM

## 2025-05-14 PROCEDURE — 96372 THER/PROPH/DIAG INJ SC/IM: CPT | Performed by: FAMILY MEDICINE

## 2025-05-14 RX ORDER — CYANOCOBALAMIN 1000 UG/ML
1000 INJECTION, SOLUTION INTRAMUSCULAR; SUBCUTANEOUS
Status: SHIPPED | OUTPATIENT
Start: 2025-05-14

## 2025-05-14 RX ADMIN — CYANOCOBALAMIN 1000 MCG: 1000 INJECTION, SOLUTION INTRAMUSCULAR; SUBCUTANEOUS at 16:14

## 2025-05-14 NOTE — TELEPHONE ENCOUNTER
Patient came into office for B12 injection, inquired on what she is supposed to do about iron as well. States that she is taking supplements prescribed by you. Please advise. WK

## 2025-05-17 DIAGNOSIS — E55.9 VITAMIN D DEFICIENCY: ICD-10-CM

## 2025-05-18 DIAGNOSIS — M51.369 DDD (DEGENERATIVE DISC DISEASE), LUMBAR: ICD-10-CM

## 2025-05-18 DIAGNOSIS — G56.03 BILATERAL CARPAL TUNNEL SYNDROME: ICD-10-CM

## 2025-05-18 DIAGNOSIS — M50.30 DDD (DEGENERATIVE DISC DISEASE), CERVICAL: ICD-10-CM

## 2025-05-18 DIAGNOSIS — M54.12 CERVICAL RADICULOPATHY: ICD-10-CM

## 2025-05-19 NOTE — TELEPHONE ENCOUNTER
Rx Refill Note  Requested Prescriptions     Pending Prescriptions Disp Refills    vitamin D (ERGOCALCIFEROL) 1.25 MG (53484 UT) capsule capsule [Pharmacy Med Name: ergocalciferol (vitamin D2) 1,250 mcg (50,000 unit) capsule] 4 capsule 2     Sig: TAKE 1 CAPSULE BY MOUTH EVERY 7 (SEVEN) DAYS.      Last office visit with prescribing clinician: 4/16/2025   Last telemedicine visit with prescribing clinician: Visit date not found   Next office visit with prescribing clinician: 5/18/2025                         Would you like a call back once the refill request has been completed: [] Yes [] No    If the office needs to give you a call back, can they leave a voicemail: [] Yes [] No    Tova Medrano CMA  05/19/25, 15:21 EDT

## 2025-05-19 NOTE — TELEPHONE ENCOUNTER
Does patient need to continue on this dose? Vit D level done on 4/16 with normal results. Please advise. WK

## 2025-05-19 NOTE — TELEPHONE ENCOUNTER
Rx Refill Note  Requested Prescriptions     Pending Prescriptions Disp Refills    pregabalin (Lyrica) 200 MG capsule 90 capsule 1     Sig: Take 1 capsule by mouth 3 (Three) Times a Day.      Last office visit with prescribing clinician: 4/16/2025   Last telemedicine visit with prescribing clinician: Visit date not found   Next office visit with prescribing clinician: 7/16/2025                         Would you like a call back once the refill request has been completed: [] Yes [] No    If the office needs to give you a call back, can they leave a voicemail: [] Yes [] No    Tova Medrano [facility]  05/19/25, 16:57 EDT

## 2025-05-20 RX ORDER — PREGABALIN 200 MG/1
200 CAPSULE ORAL 3 TIMES DAILY
Qty: 90 CAPSULE | Refills: 1 | Status: SHIPPED | OUTPATIENT
Start: 2025-05-20

## 2025-05-20 RX ORDER — ERGOCALCIFEROL 1.25 MG/1
50000 CAPSULE, LIQUID FILLED ORAL
Qty: 4 CAPSULE | Refills: 2 | Status: SHIPPED | OUTPATIENT
Start: 2025-05-20

## 2025-05-21 ENCOUNTER — CLINICAL SUPPORT (OUTPATIENT)
Dept: FAMILY MEDICINE CLINIC | Facility: CLINIC | Age: 45
End: 2025-05-21
Payer: COMMERCIAL

## 2025-05-21 DIAGNOSIS — E61.1 IRON DEFICIENCY: ICD-10-CM

## 2025-05-21 DIAGNOSIS — E53.8 B12 DEFICIENCY: Primary | ICD-10-CM

## 2025-05-21 RX ORDER — FERROUS GLUCONATE 324(38)MG
324 TABLET ORAL 2 TIMES DAILY WITH MEALS
Qty: 60 TABLET | Refills: 1 | Status: SHIPPED | OUTPATIENT
Start: 2025-05-21

## 2025-05-21 RX ADMIN — CYANOCOBALAMIN 1000 MCG: 1000 INJECTION, SOLUTION INTRAMUSCULAR; SUBCUTANEOUS at 16:01

## 2025-05-21 NOTE — TELEPHONE ENCOUNTER
Rx Refill Note  Requested Prescriptions     Signed Prescriptions Disp Refills    ferrous gluconate (FERGON) 324 MG tablet 60 tablet 1     Sig: Take 1 tablet by mouth 2 (Two) Times a Day With Meals. Or take with vitamin C.     Authorizing Provider: ELINA SULLIVAN     Ordering User: LAINE CHA      Last office visit with prescribing clinician: 4/16/2025   Last telemedicine visit with prescribing clinician: Visit date not found   Next office visit with prescribing clinician: 7/16/2025                         Would you like a call back once the refill request has been completed: [] Yes [] No    If the office needs to give you a call back, can they leave a voicemail: [] Yes [] No    Laine Cha MA  05/21/25, 15:58 EDT

## 2025-05-28 ENCOUNTER — CLINICAL SUPPORT (OUTPATIENT)
Dept: FAMILY MEDICINE CLINIC | Facility: CLINIC | Age: 45
End: 2025-05-28
Payer: COMMERCIAL

## 2025-05-28 PROCEDURE — 96372 THER/PROPH/DIAG INJ SC/IM: CPT | Performed by: FAMILY MEDICINE

## 2025-05-28 RX ADMIN — CYANOCOBALAMIN 1000 MCG: 1000 INJECTION, SOLUTION INTRAMUSCULAR; SUBCUTANEOUS at 15:13

## 2025-06-02 ENCOUNTER — OFFICE VISIT (OUTPATIENT)
Dept: FAMILY MEDICINE CLINIC | Facility: CLINIC | Age: 45
End: 2025-06-02
Payer: COMMERCIAL

## 2025-06-02 ENCOUNTER — TELEMEDICINE (OUTPATIENT)
Dept: PSYCHIATRY | Facility: CLINIC | Age: 45
End: 2025-06-02
Payer: COMMERCIAL

## 2025-06-02 VITALS
OXYGEN SATURATION: 98 % | HEART RATE: 75 BPM | HEIGHT: 62 IN | TEMPERATURE: 98.2 F | BODY MASS INDEX: 39.01 KG/M2 | SYSTOLIC BLOOD PRESSURE: 128 MMHG | WEIGHT: 212 LBS | DIASTOLIC BLOOD PRESSURE: 86 MMHG

## 2025-06-02 DIAGNOSIS — E53.8 B12 DEFICIENCY: ICD-10-CM

## 2025-06-02 DIAGNOSIS — J02.9 SORE THROAT: ICD-10-CM

## 2025-06-02 DIAGNOSIS — F51.5 NIGHTMARES: ICD-10-CM

## 2025-06-02 DIAGNOSIS — R09.81 NASAL CONGESTION: Primary | ICD-10-CM

## 2025-06-02 DIAGNOSIS — J06.9 VIRAL URI WITH COUGH: ICD-10-CM

## 2025-06-02 DIAGNOSIS — F33.0 MILD EPISODE OF RECURRENT DEPRESSIVE DISORDER: ICD-10-CM

## 2025-06-02 DIAGNOSIS — F41.1 GENERALIZED ANXIETY DISORDER: Primary | ICD-10-CM

## 2025-06-02 DIAGNOSIS — E61.1 IRON DEFICIENCY: ICD-10-CM

## 2025-06-02 DIAGNOSIS — F43.10 POST TRAUMATIC STRESS DISORDER (PTSD): ICD-10-CM

## 2025-06-02 LAB
EXPIRATION DATE: NORMAL
FLUAV AG UPPER RESP QL IA.RAPID: NOT DETECTED
FLUBV AG UPPER RESP QL IA.RAPID: NOT DETECTED
INTERNAL CONTROL: NORMAL
Lab: NORMAL
SARS-COV-2 AG UPPER RESP QL IA.RAPID: NOT DETECTED

## 2025-06-02 PROCEDURE — 99214 OFFICE O/P EST MOD 30 MIN: CPT | Performed by: NURSE PRACTITIONER

## 2025-06-02 PROCEDURE — 96127 BRIEF EMOTIONAL/BEHAV ASSMT: CPT | Performed by: NURSE PRACTITIONER

## 2025-06-02 RX ORDER — FLUOXETINE HYDROCHLORIDE 40 MG/1
40 CAPSULE ORAL DAILY
Qty: 30 CAPSULE | Refills: 2 | Status: SHIPPED | OUTPATIENT
Start: 2025-06-02

## 2025-06-02 RX ORDER — BUSPIRONE HYDROCHLORIDE 15 MG/1
30 TABLET ORAL 2 TIMES DAILY
Qty: 120 TABLET | Refills: 2 | Status: SHIPPED | OUTPATIENT
Start: 2025-06-02

## 2025-06-02 RX ORDER — PRAZOSIN HYDROCHLORIDE 1 MG/1
3 CAPSULE ORAL
Qty: 90 CAPSULE | Refills: 2 | Status: SHIPPED | OUTPATIENT
Start: 2025-06-02

## 2025-06-02 RX ADMIN — CYANOCOBALAMIN 1000 MCG: 1000 INJECTION, SOLUTION INTRAMUSCULAR; SUBCUTANEOUS at 17:43

## 2025-06-02 NOTE — PROGRESS NOTES
Subjective   Clementine Johnson is a 45 y.o. female.     History of Present Illness  She c/o congestion, sore throat, cough  URI   This is a new problem. The current episode started yesterday. The problem has been gradually worsening. Maximum temperature: subjective, max 99+ The fever has been present for 1 to 2 days. Associated symptoms include congestion, coughing, headaches, nausea, rhinorrhea, a sore throat and wheezing. Pertinent negatives include no abdominal pain, chest pain, diarrhea, dysuria, ear pain, sinus pain, sneezing or vomiting. She has tried NSAIDs and increased fluids for the symptoms. The treatment provided no relief.     States it feel similar to when she had COVID in Feb. Loss of taste, smell    Due for B12 injection. Taking her iron supplement as rx'd    The following portions of the patient's history were reviewed and updated as appropriate: allergies, current medications, past family history, past medical history, past social history, past surgical history, and problem list.    Review of Systems   Constitutional:  Positive for chills, fatigue and fever.   HENT:  Positive for congestion, rhinorrhea and sore throat. Negative for ear pain, sinus pain and sneezing.    Eyes:  Negative for pain, discharge and redness.   Respiratory:  Positive for cough, shortness of breath and wheezing.    Cardiovascular:  Negative for chest pain.   Gastrointestinal:  Positive for nausea. Negative for abdominal pain, diarrhea and vomiting.   Genitourinary:  Negative for dysuria.   Musculoskeletal:  Positive for arthralgias and myalgias.   Neurological:  Positive for headaches.   Psychiatric/Behavioral:  Negative for confusion.        Objective   Vitals:    06/02/25 1539   BP: 128/86   Pulse: 75   Temp: 98.2 °F (36.8 °C)   SpO2: 98%     Body mass index is 38.78 kg/m².      06/02/25  1539   Weight: 96.2 kg (212 lb) (Pt reported)       Physical Exam  Vitals and nursing note reviewed.   Constitutional:       General:  She is not in acute distress.     Appearance: She is obese. She is ill-appearing (mildly).   HENT:      Head: Atraumatic.      Right Ear: Tympanic membrane and ear canal normal.      Left Ear: Tympanic membrane and ear canal normal.      Nose: Congestion present. No rhinorrhea.      Mouth/Throat:      Mouth: Mucous membranes are moist. No oral lesions.      Dentition: Abnormal dentition. Gingival swelling and dental caries present.      Pharynx: Oropharynx is clear. No oropharyngeal exudate or posterior oropharyngeal erythema.   Eyes:      General: No scleral icterus.     Conjunctiva/sclera: Conjunctivae normal.   Neck:      Thyroid: No thyroid mass.   Cardiovascular:      Rate and Rhythm: Normal rate and regular rhythm.      Pulses: Normal pulses.      Heart sounds: Normal heart sounds.   Pulmonary:      Effort: Pulmonary effort is normal. No respiratory distress.      Breath sounds: Decreased breath sounds (mildly) present. No wheezing, rhonchi or rales.   Musculoskeletal:      Right lower leg: Edema (mild) present.      Left lower leg: Edema (mild) present.   Lymphadenopathy:      Cervical: No cervical adenopathy.   Skin:     General: Skin is warm and dry.      Coloration: Skin is not cyanotic, jaundiced or pale.   Neurological:      Mental Status: She is alert and oriented to person, place, and time.      Gait: Gait is intact.   Psychiatric:         Behavior: Behavior normal. Behavior is cooperative.         Cognition and Memory: Cognition normal.     Pt's previous physical exam reviewed and updated as indicated.    Recent Results (from the past 24 hours)   POCT SARS-CoV-2 + Flu Antigen MARGARITO    Collection Time: 06/02/25  5:42 PM    Specimen: Swab   Result Value Ref Range    SARS Antigen Not Detected Not Detected, Presumptive Negative    Influenza A Antigen MARGARITO Not Detected Not Detected    Influenza B Antigen MARGARITO Not Detected Not Detected    Internal Control Passed Passed    Lot Number [id number]     Expiration  Date 11,302,025        Assessment & Plan   Diagnoses and all orders for this visit:    1. Nasal congestion (Primary)  -     POCT SARS-CoV-2 + Flu Antigen MARGARITO    2. Sore throat  -     POCT SARS-CoV-2 + Flu Antigen MARGARITO    3. Viral URI with cough    4. B12 deficiency    5. Iron deficiency       Symptom mgnt of viral URI reviewed.  Continue IM B12  Continue oral iron. We will work in getting her approved for iron infusions.     F/u as scheduled. F/u sooner as needed.  Patient was encouraged to keep me informed of any acute changes, lack of improvement, or any new concerning symptoms.  Pt is aware of reasons to seek emergent care.  Patient voiced understanding of all instructions and denied further questions.    Please note that portions of this note may have been completed with a voice recognition program.

## 2025-06-02 NOTE — PROGRESS NOTES
Mode of Visit: Video   Location of patient: -HOME-   Location of provider: [facility]   You have chosen to receive care through a telehealth visit.   The patient has signed the video visit consent form.   The visit included audio and video interaction. No technical issues occurred during this visit.     Subjective   Clementine Johnson is a 45 y.o. female who presents today for follow up    Chief Complaint: Depression and anxiety    History of Present Illness:   History of Present Illness  Clementine Johnson presents for medication management follow-up. Her last follow-up appointment was 4/21/2025. Says that she is home from work today due to acute illness, concerns she may have been in contact with COVID as coworker was previously diagnosed. Currently experiencing fatigue, head congestion, unable to smell/taste and sore throat. Has appointment later today with PCP for evaluation.  Voices that overall mood has been stable since last follow-up.  Doing well with medication regimen, currently taking Prozac 40 mg daily, buspirone 15 mg 4 times daily and prazosin 3 mg nightly.  Denies any current bothersome symptoms of anxiety or depression. Feels that her overall improvement in situational stressors has helped with mood since she is now working and no longer stressed about financial concerns or living situation.  Continues to work full-time at [employer] and says things are going well at her job.  Reports continued fatigue aside from acute illness that she attributes to low B12 and iron levels.  Has been receiving B12 injections and has continued taking iron supplements due to the low levels on most recent lab work.  Sleeping adequately overall, CPAP recently changed to a BiPAP as much better, but struggles with obtaining a good seal with mask.  Denies issues with appetite. Denies SI/HI. PHQ-9 total score: 6 (previously 0), DARRIUS-7 total score: 0 (previously 0).    Previous Psych Meds: Zoloft, Paxil, Xanax, hydroxyzine,  "trazodone, propranolol currently taking Prozac (x 5 years), buspirone (x 5 years) and prazosin     The following portions of the patient's history were reviewed and updated as appropriate: allergies, current medications, past family history, past medical history, past social history, past surgical history and problem list.    Past Medical History:   Diagnosis Date    Anxiety     Arthritis     DDD (degenerative disc disease), cervical     DDD (degenerative disc disease), lumbar     Depression     Diabetes mellitus     Ear piercing     Gestational diabetes     HPV (human papilloma virus) infection     Lower back pain     PMS (premenstrual syndrome)     Pregnancy     A0 s/p CS x 1    Seasonal allergies     Substance abuse     Toxemia in pregnancy     Trauma     Varicella     Wears contact lenses     Wears glasses      Social History     Socioeconomic History    Marital status:    Tobacco Use    Smoking status: Every Day     Current packs/day: 1.00     Average packs/day: 1 pack/day for 30.4 years (30.4 ttl pk-yrs)     Types: Cigarettes     Start date: 1995     Passive exposure: Current    Smokeless tobacco: Never   Vaping Use    Vaping status: Never Used   Substance and Sexual Activity    Alcohol use: No    Drug use: Not Currently     Types: Benzodiazepines, Hydrocodone, Marijuana, Oxycodone     Comment: last use for any pills has been \"years\".  Last marijuana use was 5-6 months ago (assessed 10/17/22)    Sexual activity: Defer     Family History   Problem Relation Age of Onset    Arthritis Mother     Arthritis Father     Arthritis Maternal Aunt     Stroke Paternal Uncle     Arthritis Paternal Grandmother     Stroke Maternal Grandmother      Past Surgical History:   Procedure Laterality Date     SECTION      x1    COLPOSCOPY N/A 10/31/2022    Procedure: COLPOSCOPY OF THE VULVA, WIDE LOCAL EXCISION OF VULVAR LESIONS TIMES FIVE;  Surgeon: Nelson Copeland MD;  Location: [facility];  Service: " Obstetrics/Gynecology;  Laterality: N/A;    TUBAL COAGULATION LAPAROSCOPIC N/A 6/16/2023    Procedure: LAPAROSCOPIC TUBAL LIGATION, DRAINAGE OF LEFT OVARIAN CYST, HYSTEROSCOPY, DILATION AND CURETTAGE, ENDOMETRIAL ABLATION WITH NOVASURE;  Surgeon: Nelson Copeland MD;  Location: [facility];  Service: Obstetrics/Gynecology;  Laterality: N/A;    VULVA BIOPSY N/A 3/3/2023    Procedure: Colposcopy of vulva, Wide local excision of vulvar lesion;  Surgeon: Nelson Copeland MD;  Location: [facility];  Service: Obstetrics/Gynecology;  Laterality: N/A;    WISDOM TOOTH EXTRACTION       Patient Active Problem List   Diagnosis    Depression with anxiety    Meralgia paresthetica of left side    Vitamin D deficiency    Type 2 diabetes mellitus with hyperglycemia, without long-term current use of insulin    Chronic neck pain    Cervical radiculopathy    Seasonal allergic rhinitis due to pollen    Bronchospasm    Essential hypertension    Situational anxiety    DDD (degenerative disc disease), cervical    DDD (degenerative disc disease), lumbar    Bilateral carpal tunnel syndrome    Chronic contact dermatitis    Abnormal uterine bleeding (AUB)    Genital warts    Vulvar intraepithelial neoplasia (FAN) grade 3    Morbid obesity    Posttraumatic torticollis    Dependent edema    S/P tubal ligation    S/P endometrial ablation with Novasure    Chronic pain syndrome    Plaque psoriasis    B12 deficiency    Chronic obstructive pulmonary disease    Obstructive sleep apnea on CPAP    Diabetic retinopathy associated with type 2 diabetes mellitus    Isolated cervical dystonia    Arthropathy of lumbar facet joint    Tobacco abuse disorder     Allergies   Allergen Reactions    Nsaids Rash and Swelling     Swelling in the legs/feet    Codeine Rash    Erythromycin Rash    Erythromycin Base Rash    Tramadol Rash      Current Outpatient Medications   Medication Sig Dispense Refill    busPIRone (BUSPAR) 15 MG tablet Take 2 tablets by mouth 2  (Two) Times a Day. 120 tablet 2    FLUoxetine (PROzac) 40 MG capsule Take 1 capsule by mouth Daily. 30 capsule 2    prazosin (MINIPRESS) 1 MG capsule Take 3 capsules by mouth every night at bedtime. 90 capsule 2    B-D UF III MINI PEN NEEDLES 31G X 5 MM misc FOR USE WITH INSULIN PENS. FOLLOW INSTRUCTIONS ON INSULIN PENS. 100 each 3    buprenorphine-naloxone (SUBOXONE) 8-2 MG per SL tablet PLACE 2 TABLET UNDER TONGUE ONCE A DAY      enalapril (VASOTEC) 10 MG tablet TAKE 1 TABLET BY MOUTH DAILY. 30 tablet 11    famotidine (Pepcid) 40 MG tablet Take 1 tablet by mouth 2 (Two) Times a Day. 60 tablet 2    ferrous gluconate (FERGON) 324 MG tablet Take 1 tablet by mouth 2 (Two) Times a Day With Meals. Or take with vitamin C. 60 tablet 1    furosemide (LASIX) 20 MG tablet TAKE 1 TABLET BY MOUTH DAILY AS NEEDED FOR SWELLING 30 tablet 0    glipizide 2.5 MG tablet Take 2.5 mg by mouth 2 (Two) Times a Day Before Meals. 60 tablet 5    glucose blood (ONE TOUCH ULTRA TEST) test strip Check blood sugar twice a day. 100 each 2    glucose monitor monitoring kit 1 each 2 (Two) Times a Day 1 each 0    metFORMIN ER (GLUCOPHAGE-XR) 500 MG 24 hr tablet TAKE 2 TABLETS BY MOUTH DAILY WITH BREAKFAST. 60 tablet 11    Misc. Devices (Pulse Oximeter) misc Use 1 each Take As Directed. 1 each 0    ONETOUCH DELICA LANCETS 33G misc Inject 1 each under the skin into the appropriate area as directed 2 (Two) Times a Day. 100 each 11    Ozempic, 2 MG/DOSE, 8 MG/3ML solution pen-injector Inject 2 mg under the skin into the appropriate area as directed 1 (One) Time Per Week. 3 mL 5    pregabalin (Lyrica) 200 MG capsule Take 1 capsule by mouth 3 (Three) Times a Day. 90 capsule 1    vitamin D (ERGOCALCIFEROL) 1.25 MG (82736 UT) capsule capsule TAKE 1 CAPSULE BY MOUTH EVERY 7 (SEVEN) DAYS. 4 capsule 2     Current Facility-Administered Medications   Medication Dose Route Frequency Provider Last Rate Last Admin    cyanocobalamin injection 1,000 mcg  1,000 mcg  Intramuscular Q28 Days Carrie Ocampo MD   1,000 mcg at 05/28/25 1513     Review of Systems   Constitutional:  Positive for fatigue. Negative for activity change, appetite change, unexpected weight gain and unexpected weight loss.   Respiratory:  Negative for shortness of breath.    Cardiovascular:  Negative for chest pain.   Musculoskeletal:  Positive for neck pain and neck stiffness.   Psychiatric/Behavioral:  Positive for sleep disturbance and depressed mood. Negative for suicidal ideas. The patient is nervous/anxious.      Physical Exam  Constitutional:       General: She is not in acute distress.     Appearance: Normal appearance.   Neurological:      Mental Status: She is alert.     Vitals:   The patient was seen remotely today via a [facility] through [facility]. Unable to obtain vital signs due to nature of remote visit.    Mental Status Exam:   Hygiene:   Appears good  Cooperation:  Cooperative  Eye Contact:  ANA r/t video visit  Psychomotor Behavior:  Appropriate  Affect:  Full range and Appropriate  Mood: normal  Hopelessness: Denies  Speech:  Normal  Thought Process:  Goal directed and Linear  Thought Content:  Mood congruent  Suicidal:  None  Homicidal:  None  Hallucinations:  None  Delusion:  None  Memory:  Intact  Orientation:  Person, Place, Time, and Situation  Reliability:  good  Insight:  Good  Judgement:  Good  Impulse Control:  Good    Assessment & Plan   Problems Addressed this Visit    None  Visit Diagnoses         Generalized anxiety disorder    -  Primary    Relevant Medications    FLUoxetine (PROzac) 40 MG capsule    busPIRone (BUSPAR) 15 MG tablet      Nightmares        Relevant Medications    prazosin (MINIPRESS) 1 MG capsule      Post traumatic stress disorder (PTSD)        Relevant Medications    FLUoxetine (PROzac) 40 MG capsule    busPIRone (BUSPAR) 15 MG tablet      Mild episode of recurrent depressive disorder        Relevant Medications    FLUoxetine (PROzac) 40 MG capsule     busPIRone (BUSPAR) 15 MG tablet          Diagnoses         Codes Comments      Generalized anxiety disorder    -  Primary ICD-10-CM: F41.1  ICD-9-CM: 300.02       Nightmares     ICD-10-CM: F51.5  ICD-9-CM: 307.47       Post traumatic stress disorder (PTSD)     ICD-10-CM: F43.10  ICD-9-CM: 309.81       Mild episode of recurrent depressive disorder     ICD-10-CM: F33.0  ICD-9-CM: 296.31           Visit Diagnoses:    ICD-10-CM ICD-9-CM   1. Generalized anxiety disorder  F41.1 300.02   2. Nightmares  F51.5 307.47   3. Post traumatic stress disorder (PTSD)  F43.10 309.81   4. Mild episode of recurrent depressive disorder  F33.0 296.31     Today's visit is for medication management. She is currently doing well with medication regimen and feels that overall mood has been well controlled. Denies any current bothersome symptoms of anxiety or depression. Will continue with Prozac 40 mg daily, buspirone 15 mg 4 times daily and prazosin 3 mg nightly denies any adverse effects of medication.     -Refill Prozac 40 mg daily for anxiety and depression  -Refill Buspirone 15 mg 4 times daily for anxiety  -Refill Prazosin 3 mg nightly for nightmares    -Reviewed previous available documentation and most recent available labs. JESSIE unable to view per PDMP.       GOALS:  Short Term Goals: Patient will be compliant with medication, and patient will have no significant medication related side effects.  Patient will be engaged in psychotherapy as indicated.  Patient will report subjective improvement of symptoms.  Long term goals: To stabilize mood and treat/improve subjective symptoms, the patient will stay out of the hospital, the patient will be at an optimal level of functioning, and the patient will take all medications as prescribed.  The patient/guardian verbalized understanding and agreement with goals that were mutually set.    TREATMENT PLAN: Continue supportive psychotherapy efforts and medications as indicated for patient's  diagnosis.  Pharmacological and Non-Pharmacological treatment options discussed during today's visit. Patient/Guardian acknowledged and verbally consented with current treatment plan and was educated on the importance of compliance with treatment and follow-up appointments.      MEDICATION ISSUES:  Discussed medication options and treatment plan of prescribed medication as well as the risks, benefits, any black box warnings, and side effects including potential falls, possible impaired driving, and metabolic adversities among others. Patient is agreeable to call the office with any worsening of symptoms or onset of side effects, or if any concerns or questions arise.  The contact information for the office is made available to the patient. Patient is agreeable to call 911 or go to the nearest ER should they begin having any SI/HI, or if any urgent concerns arise. No medication side effects or related complaints today.     MEDS ORDERED DURING VISIT:  New Medications Ordered This Visit   Medications    FLUoxetine (PROzac) 40 MG capsule     Sig: Take 1 capsule by mouth Daily.     Dispense:  30 capsule     Refill:  2    busPIRone (BUSPAR) 15 MG tablet     Sig: Take 2 tablets by mouth 2 (Two) Times a Day.     Dispense:  120 tablet     Refill:  2    prazosin (MINIPRESS) 1 MG capsule     Sig: Take 3 capsules by mouth every night at bedtime.     Dispense:  90 capsule     Refill:  2     FOLLOW UP:  Return in about 4 months (around 10/2/2025) for Recheck, Video visit.             This document has been electronically signed by SHANELLE Galvez  June 2, 2025 11:21 EDT    Please note that portions of this note were completed with a voice recognition program. Efforts were made to edit dictation, but occasionally words are mistranscribed.

## 2025-06-03 ENCOUNTER — OFFICE VISIT (OUTPATIENT)
Dept: FAMILY MEDICINE CLINIC | Facility: CLINIC | Age: 45
End: 2025-06-03
Payer: COMMERCIAL

## 2025-06-03 VITALS
RESPIRATION RATE: 18 BRPM | TEMPERATURE: 97.9 F | BODY MASS INDEX: 39.01 KG/M2 | WEIGHT: 212 LBS | SYSTOLIC BLOOD PRESSURE: 110 MMHG | HEART RATE: 87 BPM | OXYGEN SATURATION: 95 % | DIASTOLIC BLOOD PRESSURE: 68 MMHG | HEIGHT: 62 IN

## 2025-06-03 DIAGNOSIS — R11.0 NAUSEA: ICD-10-CM

## 2025-06-03 DIAGNOSIS — J06.9 VIRAL UPPER RESPIRATORY INFECTION: Primary | ICD-10-CM

## 2025-06-03 DIAGNOSIS — J02.9 SORE THROAT: ICD-10-CM

## 2025-06-03 DIAGNOSIS — H65.93 MIDDLE EAR EFFUSION, BILATERAL: ICD-10-CM

## 2025-06-03 LAB
EXPIRATION DATE: NORMAL
EXPIRATION DATE: NORMAL
FLUAV AG UPPER RESP QL IA.RAPID: NOT DETECTED
FLUBV AG UPPER RESP QL IA.RAPID: NOT DETECTED
INTERNAL CONTROL: NORMAL
INTERNAL CONTROL: NORMAL
Lab: NORMAL
Lab: NORMAL
S PYO AG THROAT QL: NEGATIVE
SARS-COV-2 AG UPPER RESP QL IA.RAPID: NOT DETECTED

## 2025-06-03 RX ORDER — BROMPHENIRAMINE MALEATE, PSEUDOEPHEDRINE HYDROCHLORIDE, AND DEXTROMETHORPHAN HYDROBROMIDE 2; 30; 10 MG/5ML; MG/5ML; MG/5ML
5 SYRUP ORAL 4 TIMES DAILY PRN
Qty: 473 ML | Refills: 0 | Status: SHIPPED | OUTPATIENT
Start: 2025-06-03

## 2025-06-03 RX ORDER — PREDNISONE 10 MG/1
10 TABLET ORAL DAILY
Qty: 5 TABLET | Refills: 0 | Status: SHIPPED | OUTPATIENT
Start: 2025-06-03 | End: 2025-06-08

## 2025-06-03 RX ORDER — METHYLPREDNISOLONE ACETATE 40 MG/ML
40 INJECTION, SUSPENSION INTRA-ARTICULAR; INTRALESIONAL; INTRAMUSCULAR; SOFT TISSUE ONCE
Status: COMPLETED | OUTPATIENT
Start: 2025-06-03 | End: 2025-06-03

## 2025-06-03 RX ADMIN — METHYLPREDNISOLONE ACETATE 40 MG: 40 INJECTION, SUSPENSION INTRA-ARTICULAR; INTRALESIONAL; INTRAMUSCULAR; SOFT TISSUE at 16:30

## 2025-06-03 NOTE — PROGRESS NOTES
Same Day Office Visit      Date: 2025   Patient Name: Clementine Johnson  : 1980   MRN: [id number]     Chief Complaint:    Chief Complaint   Patient presents with    Flu Symptoms     Started , coughing-non productive, sore throat, fever, chest congestion, ear fullness, N/V/D, loss of taste and smell       History of Present Illness: Clementine Johnson is a 45 y.o. female who is here today for URI symptoms.    Patient reports that she continues to feel as if she COVID. Patient reports that she developed symptoms  morning including cough, sore throat, fever, congestion, ear fullness, headache, diarrhea, nausea. Patient reports that she was seen in clinic yesterday where she tested negative for COVID but was told she could still test positive for COVID. Patient reports that she did have temperature of 103-104F at home.     Patient reports that she was not prescribed medications. Patient reports that she has taken ibuprofen for fever at home.    Subjective     I have reviewed the patients family history, social history, past medical history, past surgical history and have updated it as appropriate.     Medications:     Current Outpatient Medications:     B-D UF III MINI PEN NEEDLES 31G X 5 MM misc, FOR USE WITH INSULIN PENS. FOLLOW INSTRUCTIONS ON INSULIN PENS., Disp: 100 each, Rfl: 3    buprenorphine-naloxone (SUBOXONE) 8-2 MG per SL tablet, PLACE 2 TABLET UNDER TONGUE ONCE A DAY, Disp: , Rfl:     busPIRone (BUSPAR) 15 MG tablet, Take 2 tablets by mouth 2 (Two) Times a Day., Disp: 120 tablet, Rfl: 2    enalapril (VASOTEC) 10 MG tablet, TAKE 1 TABLET BY MOUTH DAILY., Disp: 30 tablet, Rfl: 11    famotidine (Pepcid) 40 MG tablet, Take 1 tablet by mouth 2 (Two) Times a Day., Disp: 60 tablet, Rfl: 2    ferrous gluconate (FERGON) 324 MG tablet, Take 1 tablet by mouth 2 (Two) Times a Day With Meals. Or take with vitamin C., Disp: 60 tablet, Rfl: 1    FLUoxetine (PROzac) 40 MG capsule, Take 1  capsule by mouth Daily., Disp: 30 capsule, Rfl: 2    furosemide (LASIX) 20 MG tablet, TAKE 1 TABLET BY MOUTH DAILY AS NEEDED FOR SWELLING, Disp: 30 tablet, Rfl: 0    glipizide 2.5 MG tablet, Take 2.5 mg by mouth 2 (Two) Times a Day Before Meals., Disp: 60 tablet, Rfl: 5    glucose blood (ONE TOUCH ULTRA TEST) test strip, Check blood sugar twice a day., Disp: 100 each, Rfl: 2    glucose monitor monitoring kit, 1 each 2 (Two) Times a Day, Disp: 1 each, Rfl: 0    metFORMIN ER (GLUCOPHAGE-XR) 500 MG 24 hr tablet, TAKE 2 TABLETS BY MOUTH DAILY WITH BREAKFAST., Disp: 60 tablet, Rfl: 11    Misc. Devices (Pulse Oximeter) misc, Use 1 each Take As Directed., Disp: 1 each, Rfl: 0    ONETOUCH DELICA LANCETS 33G misc, Inject 1 each under the skin into the appropriate area as directed 2 (Two) Times a Day., Disp: 100 each, Rfl: 11    Ozempic, 2 MG/DOSE, 8 MG/3ML solution pen-injector, Inject 2 mg under the skin into the appropriate area as directed 1 (One) Time Per Week., Disp: 3 mL, Rfl: 5    prazosin (MINIPRESS) 1 MG capsule, Take 3 capsules by mouth every night at bedtime., Disp: 90 capsule, Rfl: 2    pregabalin (Lyrica) 200 MG capsule, Take 1 capsule by mouth 3 (Three) Times a Day., Disp: 90 capsule, Rfl: 1    vitamin D (ERGOCALCIFEROL) 1.25 MG (66335 UT) capsule capsule, TAKE 1 CAPSULE BY MOUTH EVERY 7 (SEVEN) DAYS., Disp: 4 capsule, Rfl: 2    brompheniramine-pseudoephedrine-DM 30-2-10 MG/5ML syrup, Take 5 mL by mouth 4 (Four) Times a Day As Needed for Allergies., Disp: 473 mL, Rfl: 0    predniSONE (DELTASONE) 10 MG tablet, Take 1 tablet by mouth Daily for 5 days., Disp: 5 tablet, Rfl: 0    Current Facility-Administered Medications:     cyanocobalamin injection 1,000 mcg, 1,000 mcg, Intramuscular, Q28 Days, Carrie Ocampo MD, 1,000 mcg at 06/02/25 1743    Allergies:   Allergies   Allergen Reactions    Nsaids Rash and Swelling     Swelling in the legs/feet    Codeine Rash    Erythromycin Rash    Erythromycin Base Rash     "Tramadol Rash       Objective     Physical Exam:     Vital Signs:   Vitals:    06/03/25 1507   BP: 110/68   Pulse: 87   Resp: 18   Temp: 97.9 °F (36.6 °C)   TempSrc: Oral   SpO2: 95%   Weight: 96.2 kg (212 lb)   Height: 157.5 cm (62\")     Body mass index is 38.78 kg/m².          Physical Exam     General: Non-toxic appearing adult female in no acute distress. Alert and oriented. Vitals reviewed and are within normal limits.  Head/ENT: Atraumatic. No facial/sinus tenderness to palpation. Tympanic membranes show small effusions bilaterally without erythema or bulge. Oral cavity shows erythematous throat without edema or exudate.  Neck: Anatomy appears symmetrical. There is no palpable lymphadenopathy to palpation.  Cardiac: Regular rate and rhythm to auscultation.   Pulmonary: Lungs are clear to auscultation bilaterally.  Integumentary/Skin: Skin appears unremarkable from observable skin surfaces.   Neurological: Normal gait and speech.  Behavioral/Psych: Patient behavior/demeanor appears consistent with reported age. Patient is pleasant with normal affect today.      Procedures    Assessment / Plan      1. Viral upper respiratory infection  - brompheniramine-pseudoephedrine-DM 30-2-10 MG/5ML syrup; Take 5 mL by mouth 4 (Four) Times a Day As Needed for Allergies.  Dispense: 473 mL; Refill: 0    2. Middle ear effusion, bilateral  - methylPREDNISolone acetate (DEPO-medrol) injection 40 mg  - predniSONE (DELTASONE) 10 MG tablet; Take 1 tablet by mouth Daily for 5 days.  Dispense: 5 tablet; Refill: 0    3. Sore throat  - POCT SARS-CoV-2 Antigen MARGARITO + Flu    4. Nausea  - POCT SARS-CoV-2 Antigen MARGARITO + Flu  - POCT rapid strep A     Assessment & Plan  1. Viral upper respiratory infection  - Continues to have upper respiratory symptoms  - Patient negative for COVID/Flu and strep today  - Hemodynamically stable, afebrile, condition largely unchanged from previous day  - Small bilateral effusions developed since previous day  - " Symptomatic care:    - Continue using Tylenol for fevers    - Bromfed prescribed for cough and congestion  - Depo-Medrol shot followed by low-dose steroid burst for bilateral ear effusions (taking into consideration of diabetes - discussion had with patient)  - Advised to return if symptoms do not improve or worsen       Patient or patient representative verbalized consent for the use of Ambient Listening during the visit with  Chi Mcelroy MD for chart documentation. 6/4/2025  15:51 EDT     Follow Up:   No follow-ups on file.      Chi Mcelroy MD  [facility]  [address] [place] [address] [place] [address]  Fax [phone]  Phone [phone]

## 2025-07-14 DIAGNOSIS — G56.03 BILATERAL CARPAL TUNNEL SYNDROME: ICD-10-CM

## 2025-07-14 DIAGNOSIS — M54.12 CERVICAL RADICULOPATHY: ICD-10-CM

## 2025-07-14 DIAGNOSIS — M50.30 DDD (DEGENERATIVE DISC DISEASE), CERVICAL: ICD-10-CM

## 2025-07-14 DIAGNOSIS — M51.369 DDD (DEGENERATIVE DISC DISEASE), LUMBAR: ICD-10-CM

## 2025-07-14 NOTE — TELEPHONE ENCOUNTER
DE    Caller: Clementine Johnson    Relationship: Self    Best call back number:[phone]     Requested Prescriptions:   Requested Prescriptions     Pending Prescriptions Disp Refills    pregabalin (Lyrica) 200 MG capsule 90 capsule 1     Sig: Take 1 capsule by mouth 3 (Three) Times a Day.        Pharmacy where request should be sent: [facility] - [place], [place] - [address] - [phone]  - [phone] FX  [facility] - [place] - [address] [facility] AT PORTAL TO REGISTERED [facility] SITES - [phone]  - [phone] FX  [facility] ([place]) - [place] - [address] [facility] [address] [facility] - [phone]  - [phone] FX     Last office visit with prescribing clinician: 6/2/2025   Last telemedicine visit with prescribing clinician: Visit date not found   Next office visit with prescribing clinician: 7/23/2025     Additional details provided by patient: WE HAD TO CHANGE UPCOMING APPOINTMENT DUE TO PROVIDER OUT WANTED TO MAKE SURE MEDICATION WOULD STILL BE CALLED IN     Does the patient have less than a 3 day supply:  [] Yes  [x] No    Would you like a call back once the refill request has been completed: [] Yes [x] No    If the office needs to give you a call back, can they leave a voicemail: [] Yes [x] No    Jose Alejandro Burris   07/14/25 11:14 EDT

## 2025-07-17 DIAGNOSIS — M54.12 CERVICAL RADICULOPATHY: ICD-10-CM

## 2025-07-17 DIAGNOSIS — M51.369 DDD (DEGENERATIVE DISC DISEASE), LUMBAR: ICD-10-CM

## 2025-07-17 DIAGNOSIS — M50.30 DDD (DEGENERATIVE DISC DISEASE), CERVICAL: ICD-10-CM

## 2025-07-17 DIAGNOSIS — G56.03 BILATERAL CARPAL TUNNEL SYNDROME: ICD-10-CM

## 2025-07-17 RX ORDER — PREGABALIN 200 MG/1
200 CAPSULE ORAL 3 TIMES DAILY
Qty: 90 CAPSULE | Refills: 1 | Status: SHIPPED | OUTPATIENT
Start: 2025-07-17

## 2025-07-17 RX ORDER — PREGABALIN 200 MG/1
200 CAPSULE ORAL 3 TIMES DAILY
Qty: 90 CAPSULE | Refills: 1 | OUTPATIENT
Start: 2025-07-17

## 2025-07-17 NOTE — TELEPHONE ENCOUNTER
Rx Refill Note  Requested Prescriptions     Pending Prescriptions Disp Refills    pregabalin (LYRICA) 200 MG capsule [Pharmacy Med Name: pregabalin 200 mg capsule] 90 capsule 1     Sig: TAKE ONE CAPSULE BY MOUTH THREE TIMES DAILY      Last office visit with prescribing clinician: 6/2/2025   Last telemedicine visit with prescribing clinician: Visit date not found   Next office visit with prescribing clinician: 7/23/2025                         Would you like a call back once the refill request has been completed: [] Yes [] No    If the office needs to give you a call back, can they leave a voicemail: [] Yes [] No    Laine Cha MA  07/17/25, 14:25 EDT

## 2025-07-17 NOTE — TELEPHONE ENCOUNTER
Rx Refill Note  Requested Prescriptions     Pending Prescriptions Disp Refills    pregabalin (Lyrica) 200 MG capsule 90 capsule 1     Sig: Take 1 capsule by mouth 3 (Three) Times a Day.      Last office visit with prescribing clinician: 6/2/2025   Last telemedicine visit with prescribing clinician: Visit date not found   Next office visit with prescribing clinician: 7/17/2025                         Would you like a call back once the refill request has been completed: [] Yes [] No    If the office needs to give you a call back, can they leave a voicemail: [] Yes [] No    Laine Cha MA  07/17/25, 14:23 EDT

## 2025-07-23 ENCOUNTER — OFFICE VISIT (OUTPATIENT)
Dept: FAMILY MEDICINE CLINIC | Facility: CLINIC | Age: 45
End: 2025-07-23
Payer: COMMERCIAL

## 2025-07-23 VITALS
TEMPERATURE: 98.6 F | RESPIRATION RATE: 16 BRPM | HEIGHT: 62 IN | SYSTOLIC BLOOD PRESSURE: 122 MMHG | WEIGHT: 203.6 LBS | HEART RATE: 80 BPM | BODY MASS INDEX: 37.47 KG/M2 | OXYGEN SATURATION: 98 % | DIASTOLIC BLOOD PRESSURE: 78 MMHG

## 2025-07-23 DIAGNOSIS — E11.65 TYPE 2 DIABETES MELLITUS WITH HYPERGLYCEMIA, WITHOUT LONG-TERM CURRENT USE OF INSULIN: ICD-10-CM

## 2025-07-23 DIAGNOSIS — I10 ESSENTIAL HYPERTENSION: ICD-10-CM

## 2025-07-23 DIAGNOSIS — G47.33 OBSTRUCTIVE SLEEP APNEA ON CPAP: ICD-10-CM

## 2025-07-23 DIAGNOSIS — Z72.0 TOBACCO ABUSE DISORDER: ICD-10-CM

## 2025-07-23 DIAGNOSIS — J44.9 CHRONIC OBSTRUCTIVE PULMONARY DISEASE, UNSPECIFIED COPD TYPE: ICD-10-CM

## 2025-07-23 DIAGNOSIS — E11.40 TYPE 2 DIABETES MELLITUS WITH DIABETIC NEUROPATHY, WITHOUT LONG-TERM CURRENT USE OF INSULIN: Primary | ICD-10-CM

## 2025-07-23 DIAGNOSIS — E61.1 IRON DEFICIENCY: ICD-10-CM

## 2025-07-23 DIAGNOSIS — E11.319 DIABETIC RETINOPATHY ASSOCIATED WITH TYPE 2 DIABETES MELLITUS, MACULAR EDEMA PRESENCE UNSPECIFIED, UNSPECIFIED LATERALITY, UNSPECIFIED RETINOPATHY SEVERITY: ICD-10-CM

## 2025-07-23 DIAGNOSIS — E53.8 B12 DEFICIENCY: ICD-10-CM

## 2025-07-23 NOTE — PROGRESS NOTES
Subjective   Clementine Johnson is a 45 y.o. female.     History of Present Illness  Here for routine f/u on several chronic med problems.    NIDDM- markedly improved control over recent months. Has been able to decrease glipizide to 2.5 mg bid and continue Ozempic and metformin. Has comorbid DPN. Currently on Lyrica.  No aberrant behavior.     HTN- taking her enalapril as rx'd. BP tends upward with regular NSAID use.     COPD- smoking up to 1 ppd, taking her inhalers generally as rx'd.     Chronic obesity-Down just under 30 pounds in past 6 months on Ozempic.     HARRIET- followed by sleep medicine. Using CPAP as rx'd. Feels tx has helped her chronic fatigue.     Followed by GYN for DUB, took iron replacement for iron def anemia.     Followed by neurology for cervical dystonia, receiving injections.     Substance use disorder currently under management at [facility].     On B12 replacement.    BG running 100-150  Random after eating 110-180    Pt's previous HPI reviewed and updated as indicated.       The following portions of the patient's history were reviewed and updated as appropriate: allergies, current medications, past family history, past medical history, past social history, past surgical history, and problem list.    Review of Systems   Constitutional:  Positive for fatigue. Negative for chills, fever and unexpected weight change.   HENT:  Positive for congestion and postnasal drip. Negative for ear pain, mouth sores, nosebleeds, rhinorrhea, sinus pain, sore throat and trouble swallowing.    Eyes:  Negative for pain, discharge and visual disturbance.   Respiratory:  Positive for shortness of breath (mild with exertion) and wheezing (intermittent). Negative for cough and chest tightness.    Cardiovascular:  Positive for leg swelling (stable). Negative for chest pain and palpitations.   Gastrointestinal:  Positive for diarrhea (mild, chronic, intermittent). Negative for abdominal pain, blood in stool,  constipation, nausea and vomiting.   Endocrine: Positive for heat intolerance. Negative for polyuria.   Genitourinary:  Negative for dysuria and hematuria.   Musculoskeletal:  Positive for arthralgias, back pain, myalgias, neck pain and neck stiffness. Negative for joint swelling.   Skin:  Positive for rash (chronic). Negative for wound.   Neurological:  Positive for numbness and headaches. Negative for dizziness, tremors, syncope and weakness.   Hematological:  Negative for adenopathy. Does not bruise/bleed easily.   Psychiatric/Behavioral:  Positive for dysphoric mood and sleep disturbance. Negative for confusion and suicidal ideas. The patient is nervous/anxious.    Pt's previous ROS reviewed and updated as indicated.       Objective   Vitals:    07/23/25 1530   BP: 122/78   Pulse: 80   Resp: 16   Temp: 98.6 °F (37 °C)   SpO2: 98%     Body mass index is 37.24 kg/m².      07/23/25  1530   Weight: 92.4 kg (203 lb 9.6 oz)       Physical Exam  Vitals and nursing note reviewed.   Constitutional:       Appearance: She is well-groomed. She is obese. She is not ill-appearing.   HENT:      Head: Atraumatic.      Mouth/Throat:      Mouth: Mucous membranes are moist. No oral lesions.   Eyes:      General: No scleral icterus.     Conjunctiva/sclera: Conjunctivae normal.   Neck:      Thyroid: No thyroid mass.   Cardiovascular:      Rate and Rhythm: Normal rate and regular rhythm.      Pulses: Normal pulses.      Heart sounds: Normal heart sounds.   Pulmonary:      Effort: Pulmonary effort is normal. No respiratory distress.      Breath sounds: Decreased breath sounds (mildly) present. No wheezing, rhonchi or rales.   Musculoskeletal:      Cervical back: Spasms present.      Lumbar back: Spasms, tenderness (diffuse soft tissue) and bony tenderness (L3-S1) present. Decreased range of motion (mildly). Negative right straight leg raise test and negative left straight leg raise test.      Right lower leg: Edema (mild) present.       Left lower leg: Edema (mild) present.   Lymphadenopathy:      Cervical: No cervical adenopathy.   Skin:     General: Skin is warm and dry.      Coloration: Skin is not jaundiced or pale.      Findings: Rash (scaling patches) present. No bruising.   Neurological:      Mental Status: She is alert and oriented to person, place, and time.      Gait: Gait is intact.   Psychiatric:         Mood and Affect: Mood and affect normal.         Behavior: Behavior normal. Behavior is cooperative.         Cognition and Memory: Cognition normal.     Pt's previous physical exam reviewed and updated as indicated.    Lab Results   Component Value Date    HGBA1C 6.1 (A) 07/24/2025    HGBA1C 6.5 (H) 04/16/2025    HGBA1C 7.7 (A) 01/06/2025     Lab Results   Component Value Date    MICROALBUR 41.2 09/05/2024    CREATININE 0.69 04/16/2025     Lab Results   Component Value Date    GLUCOSE 103 (H) 04/16/2025    BUN 5 (L) 04/16/2025    CREATININE 0.69 04/16/2025    EGFRIFNONA 112 09/29/2021    EGFRIFAFRI 129 09/29/2021    BCR 7 (L) 04/16/2025    K 3.6 04/16/2025    CO2 25 04/16/2025    CALCIUM 8.9 04/16/2025    ALBUMIN 3.8 (L) 04/16/2025    AST 14 04/16/2025    ALT 12 04/16/2025       Lab Results   Component Value Date    CHLPL 124 04/16/2025    TRIG 100 04/16/2025    HDL 46 04/16/2025    LDL 59 04/16/2025     Lab Results   Component Value Date    TSH 0.967 09/05/2024           Assessment & Plan   Diagnoses and all orders for this visit:    1. Type 2 diabetes mellitus with diabetic neuropathy, without long-term current use of insulin (Primary)  -     POC Glycosylated Hemoglobin (Hb A1C)    2. Essential hypertension    3. B12 deficiency  -     Vitamin B12  -     CBC (No Diff)  -     Cyanocobalamin 1000 MCG/ML kit; Inject 1 mL as directed Every 30 (Thirty) Days.  Dispense: 1 kit; Refill: 11    4. Diabetic retinopathy associated with type 2 diabetes mellitus, macular edema presence unspecified, unspecified laterality, unspecified retinopathy  severity    5. Iron deficiency  -     Iron Profile w/o Ferritin  -     Ferritin  -     CBC (No Diff)    6. Chronic obstructive pulmonary disease, unspecified COPD type    7. Tobacco abuse disorder    8. Obstructive sleep apnea on CPAP    9. Type 2 diabetes mellitus with hyperglycemia, without long-term current use of insulin  -     metFORMIN ER (GLUCOPHAGE-XR) 500 MG 24 hr tablet; Take 2 tablets by mouth Daily With Breakfast.  Dispense: 60 tablet; Refill: 11  -     glipiZIDE 2.5 MG tablet; Take 2.5 mg by mouth 2 (Two) Times a Day Before Meals.  Dispense: 60 tablet; Refill: 5       NIDDM - controlled. Continue Ozempic, glipizide, metformin, ace-inh. Has declined statin. Patient encouraged to take meds as rx'd, follow diabetic appropriate diet, exercise daily, perform feet check daily, and have yearly diabetic eye exams.    HTN controlled - continue enalapril.     Assess status of vit/min deficiencies and replace as indicated.    COPD - stable. Encouraged med compliance. Tobacco cessation advised.  Pt voiced understanding of health risks of cont'd tobacco use.    Encouraged CPAP compliance.    Routine f/u in 3 months, sooner as needed/instructed.  I will contact patient regarding test results and provide instructions regarding any necessary changes in plan of care.  Patient was encouraged to keep me informed of any acute changes, lack of improvement, or any new concerning symptoms.  Pt is aware of reasons to seek emergent care.  Patient voiced understanding of all instructions and denied further questions.    Please note that portions of this note may have been completed with a voice recognition program.

## 2025-07-24 LAB
ERYTHROCYTE [DISTWIDTH] IN BLOOD BY AUTOMATED COUNT: 13.5 % (ref 11.7–15.4)
EXPIRATION DATE: ABNORMAL
FERRITIN SERPL-MCNC: 208 NG/ML (ref 15–150)
HBA1C MFR BLD: 6.1 % (ref 4.5–5.7)
HCT VFR BLD AUTO: 35.4 % (ref 34–46.6)
HGB BLD-MCNC: 11.1 G/DL (ref 11.1–15.9)
IRON SATN MFR SERPL: 12 % (ref 15–55)
IRON SERPL-MCNC: 36 UG/DL (ref 27–159)
Lab: ABNORMAL
MCH RBC QN AUTO: 27.2 PG (ref 26.6–33)
MCHC RBC AUTO-ENTMCNC: 31.4 G/DL (ref 31.5–35.7)
MCV RBC AUTO: 87 FL (ref 79–97)
PLATELET # BLD AUTO: 259 X10E3/UL (ref 150–450)
RBC # BLD AUTO: 4.08 X10E6/UL (ref 3.77–5.28)
TIBC SERPL-MCNC: 291 UG/DL (ref 250–450)
UIBC SERPL-MCNC: 255 UG/DL (ref 131–425)
VIT B12 SERPL-MCNC: 675 PG/ML (ref 232–1245)
WBC # BLD AUTO: 8.7 X10E3/UL (ref 3.4–10.8)

## 2025-07-26 PROBLEM — E11.40 TYPE 2 DIABETES MELLITUS WITH DIABETIC NEUROPATHY, WITHOUT LONG-TERM CURRENT USE OF INSULIN: Status: ACTIVE | Noted: 2017-12-14

## 2025-07-26 RX ORDER — GLIPIZIDE 2.5 MG/1
2.5 TABLET ORAL
Qty: 60 TABLET | Refills: 5 | Status: SHIPPED | OUTPATIENT
Start: 2025-07-26

## 2025-07-26 RX ORDER — METFORMIN HYDROCHLORIDE 500 MG/1
1000 TABLET, EXTENDED RELEASE ORAL
Qty: 60 TABLET | Refills: 11 | Status: SHIPPED | OUTPATIENT
Start: 2025-07-26

## 2025-07-28 ENCOUNTER — PRIOR AUTHORIZATION (OUTPATIENT)
Dept: FAMILY MEDICINE CLINIC | Facility: CLINIC | Age: 45
End: 2025-07-28
Payer: COMMERCIAL

## 2025-07-28 NOTE — TELEPHONE ENCOUNTER
Prior Authorization has been started on Cover My Meds for glipiZIDE 2.5MG tablets    Waiting on response from insurance       Key:[id number]

## 2025-07-29 NOTE — TELEPHONE ENCOUNTER
[place] denied due to the following reasoning per insurance:   Your plan only covers this drug when you meet one of these options: A) You have tried other drugs your plan covers (preferred drugs), and they did not work well for you, or B) Your doctor gives us a medical reason you cannot take those other drugs. For your plan, you may need to try up to three  preferred drugs. We have denied your request because you do not meet any of these conditions. We reviewed the information we had. Your request has been denied. Your doctor can send us any new or  missing information for us to review. The preferred drugs for your plan are: glimepiride, glipizide, glipizide ext-rel.     Appeal has been faxed

## 2025-07-31 DIAGNOSIS — E11.40 TYPE 2 DIABETES MELLITUS WITH DIABETIC NEUROPATHY, WITHOUT LONG-TERM CURRENT USE OF INSULIN: Primary | ICD-10-CM

## 2025-07-31 DIAGNOSIS — E78.5 DYSLIPIDEMIA: ICD-10-CM

## 2025-07-31 RX ORDER — ATORVASTATIN CALCIUM 10 MG/1
10 TABLET, FILM COATED ORAL DAILY
Qty: 90 TABLET | Refills: 1 | Status: SHIPPED | OUTPATIENT
Start: 2025-07-31

## 2025-08-04 ENCOUNTER — TELEPHONE (OUTPATIENT)
Dept: FAMILY MEDICINE CLINIC | Facility: CLINIC | Age: 45
End: 2025-08-04
Payer: COMMERCIAL

## 2025-08-05 RX ORDER — FUROSEMIDE 20 MG/1
TABLET ORAL
Qty: 30 TABLET | Refills: 0 | Status: SHIPPED | OUTPATIENT
Start: 2025-08-05

## 2025-08-11 DIAGNOSIS — E55.9 VITAMIN D DEFICIENCY: ICD-10-CM

## 2025-08-11 RX ORDER — ERGOCALCIFEROL 1.25 MG/1
50000 CAPSULE, LIQUID FILLED ORAL WEEKLY
Qty: 4 CAPSULE | Refills: 2 | Status: SHIPPED | OUTPATIENT
Start: 2025-08-11